# Patient Record
Sex: FEMALE | Race: WHITE | NOT HISPANIC OR LATINO | Employment: OTHER | URBAN - METROPOLITAN AREA
[De-identification: names, ages, dates, MRNs, and addresses within clinical notes are randomized per-mention and may not be internally consistent; named-entity substitution may affect disease eponyms.]

---

## 2023-10-17 ENCOUNTER — HOSPITAL ENCOUNTER (EMERGENCY)
Facility: HOSPITAL | Age: 68
Discharge: HOME | End: 2023-10-17
Attending: STUDENT IN AN ORGANIZED HEALTH CARE EDUCATION/TRAINING PROGRAM
Payer: OTHER GOVERNMENT

## 2023-10-17 ENCOUNTER — APPOINTMENT (OUTPATIENT)
Dept: RADIOLOGY | Facility: HOSPITAL | Age: 68
End: 2023-10-17
Payer: OTHER GOVERNMENT

## 2023-10-17 VITALS
SYSTOLIC BLOOD PRESSURE: 145 MMHG | RESPIRATION RATE: 18 BRPM | WEIGHT: 216.49 LBS | HEART RATE: 72 BPM | OXYGEN SATURATION: 98 % | DIASTOLIC BLOOD PRESSURE: 65 MMHG | BODY MASS INDEX: 33.98 KG/M2 | TEMPERATURE: 98.2 F | HEIGHT: 67 IN

## 2023-10-17 DIAGNOSIS — M25.551 PAIN OF RIGHT HIP: ICD-10-CM

## 2023-10-17 DIAGNOSIS — M54.31 SCIATICA OF RIGHT SIDE: Primary | ICD-10-CM

## 2023-10-17 PROCEDURE — 2500000001 HC RX 250 WO HCPCS SELF ADMINISTERED DRUGS (ALT 637 FOR MEDICARE OP): Performed by: PHYSICIAN ASSISTANT

## 2023-10-17 PROCEDURE — 96372 THER/PROPH/DIAG INJ SC/IM: CPT

## 2023-10-17 PROCEDURE — 73502 X-RAY EXAM HIP UNI 2-3 VIEWS: CPT | Mod: RT

## 2023-10-17 PROCEDURE — 2500000004 HC RX 250 GENERAL PHARMACY W/ HCPCS (ALT 636 FOR OP/ED): Performed by: PHYSICIAN ASSISTANT

## 2023-10-17 PROCEDURE — 99284 EMERGENCY DEPT VISIT MOD MDM: CPT | Mod: 25 | Performed by: STUDENT IN AN ORGANIZED HEALTH CARE EDUCATION/TRAINING PROGRAM

## 2023-10-17 PROCEDURE — 72100 X-RAY EXAM L-S SPINE 2/3 VWS: CPT | Mod: FY

## 2023-10-17 RX ORDER — ORPHENADRINE CITRATE 100 MG/1
100 TABLET, EXTENDED RELEASE ORAL 2 TIMES DAILY PRN
Status: DISCONTINUED | OUTPATIENT
Start: 2023-10-17 | End: 2023-10-17 | Stop reason: HOSPADM

## 2023-10-17 RX ORDER — HYDROCODONE BITARTRATE AND ACETAMINOPHEN 5; 325 MG/1; MG/1
1 TABLET ORAL ONCE
Status: COMPLETED | OUTPATIENT
Start: 2023-10-17 | End: 2023-10-17

## 2023-10-17 RX ORDER — HYDROCODONE BITARTRATE AND ACETAMINOPHEN 5; 325 MG/1; MG/1
1 TABLET ORAL EVERY 6 HOURS PRN
Qty: 10 TABLET | Refills: 0 | Status: SHIPPED | OUTPATIENT
Start: 2023-10-17

## 2023-10-17 RX ORDER — ACETAMINOPHEN 325 MG/1
650 TABLET ORAL ONCE
Status: COMPLETED | OUTPATIENT
Start: 2023-10-17 | End: 2023-10-17

## 2023-10-17 RX ORDER — KETOROLAC TROMETHAMINE 30 MG/ML
30 INJECTION, SOLUTION INTRAMUSCULAR; INTRAVENOUS ONCE
Status: COMPLETED | OUTPATIENT
Start: 2023-10-17 | End: 2023-10-17

## 2023-10-17 RX ORDER — HYDROCODONE BITARTRATE AND ACETAMINOPHEN 5; 325 MG/1; MG/1
1 TABLET ORAL EVERY 6 HOURS PRN
Qty: 10 TABLET | Refills: 0 | Status: SHIPPED | OUTPATIENT
Start: 2023-10-17 | End: 2023-10-17 | Stop reason: SDUPTHER

## 2023-10-17 RX ORDER — METHYLPREDNISOLONE 4 MG/1
TABLET ORAL
Qty: 21 TABLET | Refills: 0 | Status: SHIPPED | OUTPATIENT
Start: 2023-10-17 | End: 2023-10-27 | Stop reason: HOSPADM

## 2023-10-17 RX ORDER — ORPHENADRINE CITRATE 100 MG/1
100 TABLET, EXTENDED RELEASE ORAL 2 TIMES DAILY PRN
Qty: 14 TABLET | Refills: 0 | Status: SHIPPED | OUTPATIENT
Start: 2023-10-17

## 2023-10-17 RX ADMIN — HYDROCODONE BITARTRATE AND ACETAMINOPHEN 1 TABLET: 5; 325 TABLET ORAL at 15:57

## 2023-10-17 RX ADMIN — ACETAMINOPHEN 650 MG: 325 TABLET ORAL at 15:55

## 2023-10-17 RX ADMIN — ORPHENADRINE CITRATE 100 MG: 100 TABLET, EXTENDED RELEASE ORAL at 17:01

## 2023-10-17 RX ADMIN — KETOROLAC TROMETHAMINE 30 MG: 30 INJECTION, SOLUTION INTRAMUSCULAR at 15:56

## 2023-10-17 ASSESSMENT — PAIN DESCRIPTION - DESCRIPTORS: DESCRIPTORS: SHARP;STABBING;BURNING

## 2023-10-17 ASSESSMENT — PAIN SCALES - GENERAL
PAINLEVEL_OUTOF10: 10 - WORST POSSIBLE PAIN
PAINLEVEL_OUTOF10: 2
PAINLEVEL_OUTOF10: 10 - WORST POSSIBLE PAIN
PAINLEVEL_OUTOF10: 3
PAINLEVEL_OUTOF10: 9
PAINLEVEL_OUTOF10: 10 - WORST POSSIBLE PAIN

## 2023-10-17 ASSESSMENT — PAIN DESCRIPTION - ORIENTATION
ORIENTATION: RIGHT
ORIENTATION: RIGHT

## 2023-10-17 ASSESSMENT — PAIN - FUNCTIONAL ASSESSMENT: PAIN_FUNCTIONAL_ASSESSMENT: 0-10

## 2023-10-17 ASSESSMENT — PAIN DESCRIPTION - LOCATION
LOCATION: HIP
LOCATION: HIP

## 2023-10-17 ASSESSMENT — PAIN DESCRIPTION - FREQUENCY: FREQUENCY: CONSTANT/CONTINUOUS

## 2023-10-17 ASSESSMENT — PAIN DESCRIPTION - ONSET: ONSET: GRADUAL

## 2023-10-17 ASSESSMENT — PAIN DESCRIPTION - PROGRESSION: CLINICAL_PROGRESSION: NOT CHANGED

## 2023-10-17 ASSESSMENT — PAIN DESCRIPTION - PAIN TYPE: TYPE: ACUTE PAIN

## 2023-10-17 NOTE — ED PROVIDER NOTES
HPI   Chief Complaint   Patient presents with   • Hip Pain     Pt states that two weeks ago she had four or five falls in the span of a few days. She walks with a walker. Pt states that the past five days she's had increasing right hip pain. No BT.  Did not hit head/No LOC either time. No falls today.       68-year-old female presented emergency department with a chief complaint of right back and right hip pain that radiates down her right lateral leg.  She has been dealing with this for the last several weeks.  Has been using lidocaine patches.  She walks with a walker at her baseline.  She states that she had a fall about 3 weeks ago as well.  She states she had prior lumbar surgery.  She denies bowel bladder dysfunction, saddle anesthesia.                          [place] Coma Scale Score: 15                  Patient History   No past medical history on file.  Past Surgical History:   Procedure Laterality Date   • MR HEAD ANGIO WO IV CONTRAST  2/22/2022    MR HEAD ANGIO WO IV CONTRAST [facility]   • MR HEAD ANGIO WO IV CONTRAST  2/22/2022    MR HEAD ANGIO WO IV CONTRAST [facility]   • MR HEAD ANGIO WO IV CONTRAST  4/28/2023    MR HEAD ANGIO WO IV CONTRAST [facility]   • MR NECK ANGIO WO IV CONTRAST  2/22/2022    MR NECK ANGIO WO IV CONTRAST [facility]   • MR NECK ANGIO WO IV CONTRAST  4/28/2023    MR NECK ANGIO WO IV CONTRAST [facility]     No family history on file.  Social History     Tobacco Use   • Smoking status: Not on file   • Smokeless tobacco: Not on file   Substance Use Topics   • Alcohol use: Not on file   • Drug use: Not on file       Physical Exam   ED Triage Vitals [10/17/23 1505]   Temp Heart Rate Resp BP   36.8 °C (98.3 °F) 72 18 145/65      SpO2 Temp Source Heart Rate Source Patient Position   98 % Oral -- Lying      BP Location FiO2 (%)     Right arm --       Physical Exam  Vitals and nursing note reviewed.   Constitutional:       Appearance:  Normal appearance.   HENT:      Head: Normocephalic and atraumatic.   Cardiovascular:      Rate and Rhythm: Normal rate and regular rhythm.   Pulmonary:      Effort: Pulmonary effort is normal.      Breath sounds: Normal breath sounds.   Abdominal:      General: Abdomen is flat.      Palpations: Abdomen is soft.   Musculoskeletal:      Cervical back: Normal range of motion.      Comments: Tenderness to right sacroiliac joint, forage motion of lower extremities bilaterally   Skin:     General: Skin is warm and dry.   Neurological:      General: No focal deficit present.      Mental Status: She is alert and oriented to person, place, and time.      Comments: Strength and sensation intact in lower extremities bilaterally   Psychiatric:         Mood and Affect: Mood normal.         ED Course & MDM   Diagnoses as of 10/17/23 1729   Sciatica of right side   Pain of right hip       Medical Decision Making  XR hip right 2 or 3 views   Final Result    No acute findings.                MACRO:    None          Signed by: Jamil Escalera 10/17/2023 4:17 PM    Dictation workstation:   [id number]     XR lumbar spine 2-3 views   Final Result    1. No acute lumbar spine findings.    2. Interval mild progression of adjacent disc disease at L2-L3.                MACRO:    None          Signed by: Jamil Escalera 10/17/2023 4:15 PM    Dictation workstation:   [id number]     I have seen and evaluated this patient.  The attending physician has also seen and evaluated this patient.  Vital signs, laboratory testing and diagnostic images if applicable have been reviewed.  All laboratory and imaging is interpreted by myself unless otherwise stated.  Radiology studies are also formally interpreted by radiologist.    X-rays without acute finding.  Patient feels improved in emergency department.  She will be discharged with outpatient primary follow-up.    Prescriptions: Medrol Dosepak, 5 mg Norco, 100 mg orphenadrine         Procedure  Procedures      Chon Gutierres PA-C  10/17/23 1726       Chon Gutierres PA-C  10/17/23 172

## 2023-10-23 ENCOUNTER — HOSPITAL ENCOUNTER (INPATIENT)
Facility: HOSPITAL | Age: 68
LOS: 4 days | Discharge: NURSING, REHAB OR LONG-TERM CARE | DRG: 690 | End: 2023-10-27
Attending: EMERGENCY MEDICINE | Admitting: INTERNAL MEDICINE
Payer: MEDICARE

## 2023-10-23 ENCOUNTER — APPOINTMENT (OUTPATIENT)
Dept: RADIOLOGY | Facility: HOSPITAL | Age: 68
DRG: 690 | End: 2023-10-23
Payer: MEDICARE

## 2023-10-23 DIAGNOSIS — Z53.20 FUNCTIONAL ASSESSMENT DECLINED: ICD-10-CM

## 2023-10-23 DIAGNOSIS — N39.0 URINARY TRACT INFECTION WITHOUT HEMATURIA, SITE UNSPECIFIED: ICD-10-CM

## 2023-10-23 DIAGNOSIS — S09.90XA CLOSED HEAD INJURY, INITIAL ENCOUNTER: Primary | ICD-10-CM

## 2023-10-23 DIAGNOSIS — Z72.0 TOBACCO USE: ICD-10-CM

## 2023-10-23 DIAGNOSIS — R53.1 GENERALIZED WEAKNESS: ICD-10-CM

## 2023-10-23 DIAGNOSIS — M25.551 PAIN OF RIGHT HIP: ICD-10-CM

## 2023-10-23 DIAGNOSIS — N30.00 ACUTE CYSTITIS WITHOUT HEMATURIA: ICD-10-CM

## 2023-10-23 DIAGNOSIS — K59.00 CONSTIPATION, UNSPECIFIED CONSTIPATION TYPE: ICD-10-CM
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
## 2023-10-23 DIAGNOSIS — S01.01XA LACERATION OF SCALP, INITIAL ENCOUNTER: ICD-10-CM

## 2023-10-23 LAB
ALBUMIN SERPL-MCNC: 4.4 G/DL (ref 3.5–5)
ALP BLD-CCNC: 153 U/L (ref 35–125)
ALT SERPL-CCNC: 36 U/L (ref 5–40)
ANION GAP SERPL CALC-SCNC: 14 MMOL/L
APPEARANCE UR: ABNORMAL
AST SERPL-CCNC: 24 U/L (ref 5–40)
BACTERIA #/AREA URNS AUTO: ABNORMAL /HPF
BILIRUB SERPL-MCNC: 0.3 MG/DL (ref 0.1–1.2)
BILIRUB UR STRIP.AUTO-MCNC: NEGATIVE MG/DL
BUN SERPL-MCNC: 29 MG/DL (ref 8–25)
CALCIUM SERPL-MCNC: 9.5 MG/DL (ref 8.5–10.4)
CHLORIDE SERPL-SCNC: 95 MMOL/L (ref 97–107)
CK SERPL-CCNC: 42 U/L (ref 24–195)
CO2 SERPL-SCNC: 23 MMOL/L (ref 24–31)
COLOR UR: YELLOW
CREAT SERPL-MCNC: 0.9 MG/DL (ref 0.4–1.6)
ERYTHROCYTE [DISTWIDTH] IN BLOOD BY AUTOMATED COUNT: 14.2 % (ref 11.5–14.5)
GFR SERPL CREATININE-BSD FRML MDRD: 69 ML/MIN/1.73M*2
GLUCOSE SERPL-MCNC: 144 MG/DL (ref 65–99)
GLUCOSE UR STRIP.AUTO-MCNC: NORMAL MG/DL
HCT VFR BLD AUTO: 44.6 % (ref 36–46)
HGB BLD-MCNC: 15.2 G/DL (ref 12–16)
KETONES UR STRIP.AUTO-MCNC: NEGATIVE MG/DL
LEUKOCYTE ESTERASE UR QL STRIP.AUTO: ABNORMAL
MAGNESIUM SERPL-MCNC: 2.2 MG/DL (ref 1.6–3.1)
MCH RBC QN AUTO: 31.1 PG (ref 26–34)
MCHC RBC AUTO-ENTMCNC: 34.1 G/DL (ref 32–36)
MCV RBC AUTO: 91 FL (ref 80–100)
NITRITE UR QL STRIP.AUTO: ABNORMAL
NRBC BLD-RTO: 0 /100 WBCS (ref 0–0)
PH UR STRIP.AUTO: 5.5 [PH]
PLATELET # BLD AUTO: 321 X10*3/UL (ref 150–450)
PMV BLD AUTO: 10.7 FL (ref 7.5–11.5)
POTASSIUM SERPL-SCNC: 4.1 MMOL/L (ref 3.4–5.1)
PROT SERPL-MCNC: 7.2 G/DL (ref 5.9–7.9)
PROT UR STRIP.AUTO-MCNC: ABNORMAL MG/DL
RBC # BLD AUTO: 4.89 X10*6/UL (ref 4–5.2)
RBC # UR STRIP.AUTO: NEGATIVE /UL
RBC #/AREA URNS AUTO: ABNORMAL /HPF
SODIUM SERPL-SCNC: 132 MMOL/L (ref 133–145)
SP GR UR STRIP.AUTO: 1.02
SQUAMOUS #/AREA URNS AUTO: ABNORMAL /HPF
TROPONIN T SERPL-MCNC: 10 NG/L
TROPONIN T SERPL-MCNC: 10 NG/L
TROPONIN T SERPL-MCNC: 9 NG/L
UROBILINOGEN UR STRIP.AUTO-MCNC: NORMAL MG/DL
WBC # BLD AUTO: 12.4 X10*3/UL (ref 4.4–11.3)
WBC #/AREA URNS AUTO: ABNORMAL /HPF

## 2023-10-23 PROCEDURE — 70450 CT HEAD/BRAIN W/O DYE: CPT | Mod: MG

## 2023-10-23 PROCEDURE — 99285 EMERGENCY DEPT VISIT HI MDM: CPT | Mod: 25 | Performed by: EMERGENCY MEDICINE

## 2023-10-23 PROCEDURE — 71046 X-RAY EXAM CHEST 2 VIEWS: CPT

## 2023-10-23 PROCEDURE — 85027 COMPLETE CBC AUTOMATED: CPT | Performed by: CLINICAL NURSE SPECIALIST

## 2023-10-23 PROCEDURE — 2500000004 HC RX 250 GENERAL PHARMACY W/ HCPCS (ALT 636 FOR OP/ED): Performed by: CLINICAL NURSE SPECIALIST

## 2023-10-23 PROCEDURE — 84484 ASSAY OF TROPONIN QUANT: CPT | Performed by: CLINICAL NURSE SPECIALIST

## 2023-10-23 PROCEDURE — 36415 COLL VENOUS BLD VENIPUNCTURE: CPT | Performed by: CLINICAL NURSE SPECIALIST

## 2023-10-23 PROCEDURE — 12032 INTMD RPR S/A/T/EXT 2.6-7.5: CPT | Performed by: CLINICAL NURSE SPECIALIST

## 2023-10-23 PROCEDURE — 93010 ELECTROCARDIOGRAM REPORT: CPT | Performed by: INTERNAL MEDICINE

## 2023-10-23 PROCEDURE — 83735 ASSAY OF MAGNESIUM: CPT | Performed by: CLINICAL NURSE SPECIALIST

## 2023-10-23 PROCEDURE — 81001 URINALYSIS AUTO W/SCOPE: CPT | Performed by: CLINICAL NURSE SPECIALIST

## 2023-10-23 PROCEDURE — 0HQ1XZZ REPAIR FACE SKIN, EXTERNAL APPROACH: ICD-10-PCS | Performed by: CLINICAL NURSE SPECIALIST

## 2023-10-23 PROCEDURE — 82550 ASSAY OF CK (CPK): CPT | Performed by: CLINICAL NURSE SPECIALIST

## 2023-10-23 PROCEDURE — 9420000001 HC RT PATIENT EDUCATION 5 MIN

## 2023-10-23 PROCEDURE — 90471 IMMUNIZATION ADMIN: CPT | Performed by: CLINICAL NURSE SPECIALIST

## 2023-10-23 PROCEDURE — 80053 COMPREHEN METABOLIC PANEL: CPT | Performed by: CLINICAL NURSE SPECIALIST

## 2023-10-23 PROCEDURE — 2500000005 HC RX 250 GENERAL PHARMACY W/O HCPCS: Performed by: CLINICAL NURSE SPECIALIST

## 2023-10-23 PROCEDURE — 96365 THER/PROPH/DIAG IV INF INIT: CPT | Mod: 59

## 2023-10-23 PROCEDURE — 90715 TDAP VACCINE 7 YRS/> IM: CPT | Performed by: CLINICAL NURSE SPECIALIST

## 2023-10-23 PROCEDURE — 72125 CT NECK SPINE W/O DYE: CPT | Mod: ME

## 2023-10-23 PROCEDURE — 1200000002 HC GENERAL ROOM WITH TELEMETRY DAILY

## 2023-10-23 RX ORDER — AMLODIPINE BESYLATE 10 MG/1
10 TABLET ORAL DAILY
Status: DISCONTINUED | OUTPATIENT
Start: 2023-10-24 | End: 2023-10-27 | Stop reason: HOSPADM

## 2023-10-23 RX ORDER — BISOPROLOL FUMARATE 5 MG/1
5 TABLET, FILM COATED ORAL DAILY
Status: DISCONTINUED | OUTPATIENT
Start: 2023-10-24 | End: 2023-10-27 | Stop reason: HOSPADM

## 2023-10-23 RX ORDER — CEFTRIAXONE 1 G/50ML
1 INJECTION, SOLUTION INTRAVENOUS EVERY 24 HOURS
Status: DISCONTINUED | OUTPATIENT
Start: 2023-10-24 | End: 2023-10-27 | Stop reason: HOSPADM

## 2023-10-23 RX ORDER — HYDRALAZINE HYDROCHLORIDE 10 MG/1
10 TABLET, FILM COATED ORAL 3 TIMES DAILY
COMMUNITY
Start: 2023-06-01

## 2023-10-23 RX ORDER — VENLAFAXINE HYDROCHLORIDE 150 MG/1
2 CAPSULE, EXTENDED RELEASE ORAL DAILY
COMMUNITY
Start: 2023-10-06

## 2023-10-23 RX ORDER — TRAMADOL HYDROCHLORIDE 50 MG/1
50 TABLET ORAL EVERY 6 HOURS PRN
Status: DISCONTINUED | OUTPATIENT
Start: 2023-10-23 | End: 2023-10-27 | Stop reason: HOSPADM

## 2023-10-23 RX ORDER — TRAZODONE HYDROCHLORIDE 100 MG/1
100 TABLET ORAL NIGHTLY
Status: DISCONTINUED | OUTPATIENT
Start: 2023-10-23 | End: 2023-10-24

## 2023-10-23 RX ORDER — HYDROXYZINE PAMOATE 25 MG/1
25 CAPSULE ORAL 3 TIMES DAILY PRN
COMMUNITY
Start: 2023-10-06

## 2023-10-23 RX ORDER — METHENAMINE HIPPURATE 1000 MG/1
1 TABLET ORAL 2 TIMES DAILY
COMMUNITY
Start: 2023-06-22

## 2023-10-23 RX ORDER — ACETAMINOPHEN 325 MG/1
650 TABLET ORAL EVERY 4 HOURS PRN
Status: DISCONTINUED | OUTPATIENT
Start: 2023-10-23 | End: 2023-10-27 | Stop reason: HOSPADM

## 2023-10-23 RX ORDER — HYDROCODONE BITARTRATE AND ACETAMINOPHEN 5; 325 MG/1; MG/1
1 TABLET ORAL EVERY 6 HOURS PRN
Status: DISCONTINUED | OUTPATIENT
Start: 2023-10-23 | End: 2023-10-27 | Stop reason: HOSPADM

## 2023-10-23 RX ORDER — TRAZODONE HYDROCHLORIDE 100 MG/1
100 TABLET ORAL NIGHTLY
Status: ON HOLD | COMMUNITY
Start: 2011-07-25 | End: 2023-10-27

## 2023-10-23 RX ORDER — CHOLECALCIFEROL (VITAMIN D3) 50 MCG
2000 TABLET ORAL DAILY
Status: DISCONTINUED | OUTPATIENT
Start: 2023-10-24 | End: 2023-10-27 | Stop reason: HOSPADM

## 2023-10-23 RX ORDER — HYDRALAZINE HYDROCHLORIDE 10 MG/1
10 TABLET, FILM COATED ORAL 3 TIMES DAILY
Status: DISCONTINUED | OUTPATIENT
Start: 2023-10-23 | End: 2023-10-27 | Stop reason: HOSPADM

## 2023-10-23 RX ORDER — ALBUTEROL SULFATE 90 UG/1
2 AEROSOL, METERED RESPIRATORY (INHALATION) EVERY 6 HOURS PRN
COMMUNITY
Start: 2023-06-01

## 2023-10-23 RX ORDER — METHENAMINE HIPPURATE 1000 MG/1
1 TABLET ORAL 2 TIMES DAILY
Status: DISCONTINUED | OUTPATIENT
Start: 2023-10-23 | End: 2023-10-27 | Stop reason: HOSPADM

## 2023-10-23 RX ORDER — TALC
3 POWDER (GRAM) TOPICAL NIGHTLY
Status: DISCONTINUED | OUTPATIENT
Start: 2023-10-23 | End: 2023-10-27 | Stop reason: HOSPADM

## 2023-10-23 RX ORDER — NYSTATIN 100000 U/G
CREAM TOPICAL AS NEEDED
Status: DISCONTINUED | OUTPATIENT
Start: 2023-10-23 | End: 2023-10-27 | Stop reason: HOSPADM

## 2023-10-23 RX ORDER — DOCUSATE SODIUM 100 MG/1
100 CAPSULE, LIQUID FILLED ORAL 2 TIMES DAILY
Status: DISCONTINUED | OUTPATIENT
Start: 2023-10-23 | End: 2023-10-27 | Stop reason: HOSPADM

## 2023-10-23 RX ORDER — BUSPIRONE HYDROCHLORIDE 15 MG/1
15 TABLET ORAL 2 TIMES DAILY
COMMUNITY
Start: 2011-07-25

## 2023-10-23 RX ORDER — DULOXETIN HYDROCHLORIDE 60 MG/1
60 CAPSULE, DELAYED RELEASE ORAL DAILY
Status: DISCONTINUED | OUTPATIENT
Start: 2023-10-24 | End: 2023-10-24

## 2023-10-23 RX ORDER — HYDROXYZINE PAMOATE 25 MG/1
25 CAPSULE ORAL 3 TIMES DAILY PRN
Status: DISCONTINUED | OUTPATIENT
Start: 2023-10-23 | End: 2023-10-24

## 2023-10-23 RX ORDER — ORPHENADRINE CITRATE 100 MG/1
100 TABLET, EXTENDED RELEASE ORAL 2 TIMES DAILY PRN
Status: DISCONTINUED | OUTPATIENT
Start: 2023-10-23 | End: 2023-10-27 | Stop reason: HOSPADM

## 2023-10-23 RX ORDER — ALBUTEROL SULFATE 0.83 MG/ML
2.5 SOLUTION RESPIRATORY (INHALATION) 4 TIMES DAILY
COMMUNITY
Start: 2023-09-28

## 2023-10-23 RX ORDER — ALBUTEROL SULFATE 0.83 MG/ML
2.5 SOLUTION RESPIRATORY (INHALATION) 4 TIMES DAILY
Status: DISCONTINUED | OUTPATIENT
Start: 2023-10-23 | End: 2023-10-24

## 2023-10-23 RX ORDER — GUAIFENESIN 600 MG/1
600 TABLET, EXTENDED RELEASE ORAL EVERY 12 HOURS PRN
Status: DISCONTINUED | OUTPATIENT
Start: 2023-10-23 | End: 2023-10-27 | Stop reason: HOSPADM

## 2023-10-23 RX ORDER — HEPARIN SODIUM 5000 [USP'U]/ML
5000 INJECTION, SOLUTION INTRAVENOUS; SUBCUTANEOUS EVERY 8 HOURS
Status: DISCONTINUED | OUTPATIENT
Start: 2023-10-23 | End: 2023-10-27 | Stop reason: HOSPADM

## 2023-10-23 RX ORDER — ASPIRIN 81 MG/1
81 TABLET ORAL DAILY
COMMUNITY
Start: 2023-06-01

## 2023-10-23 RX ORDER — ESTRADIOL 0.1 MG/G
0.5 CREAM VAGINAL 2 TIMES WEEKLY
Status: DISCONTINUED | OUTPATIENT
Start: 2023-10-27 | End: 2023-10-24

## 2023-10-23 RX ORDER — TALC
3 POWDER (GRAM) TOPICAL NIGHTLY
COMMUNITY

## 2023-10-23 RX ORDER — FLUTICASONE FUROATE AND VILANTEROL 200; 25 UG/1; UG/1
1 POWDER RESPIRATORY (INHALATION)
Status: DISCONTINUED | OUTPATIENT
Start: 2023-10-24 | End: 2023-10-24 | Stop reason: SDUPTHER

## 2023-10-23 RX ORDER — FLUTICASONE PROPIONATE AND SALMETEROL 250; 50 UG/1; UG/1
1 POWDER RESPIRATORY (INHALATION)
COMMUNITY
Start: 2023-06-28 | End: 2023-10-27 | Stop reason: HOSPADM

## 2023-10-23 RX ORDER — FORMOTEROL FUMARATE DIHYDRATE 20 UG/2ML
20 SOLUTION RESPIRATORY (INHALATION)
Status: DISCONTINUED | OUTPATIENT
Start: 2023-10-23 | End: 2023-10-27 | Stop reason: HOSPADM

## 2023-10-23 RX ORDER — DULOXETIN HYDROCHLORIDE 60 MG/1
60 CAPSULE, DELAYED RELEASE ORAL DAILY
COMMUNITY
Start: 2011-07-25

## 2023-10-23 RX ORDER — CARBAMAZEPINE 200 MG/1
200 TABLET ORAL 2 TIMES DAILY
COMMUNITY
Start: 2023-09-20

## 2023-10-23 RX ORDER — ACETAMINOPHEN 650 MG/1
650 SUPPOSITORY RECTAL EVERY 4 HOURS PRN
Status: DISCONTINUED | OUTPATIENT
Start: 2023-10-23 | End: 2023-10-27 | Stop reason: HOSPADM

## 2023-10-23 RX ORDER — VENLAFAXINE HYDROCHLORIDE 150 MG/1
150 TABLET, EXTENDED RELEASE ORAL
COMMUNITY
Start: 2022-09-01 | End: 2023-10-27 | Stop reason: HOSPADM

## 2023-10-23 RX ORDER — LOVASTATIN 20 MG/1
1 TABLET ORAL NIGHTLY
COMMUNITY
Start: 2011-07-25 | End: 2024-04-16 | Stop reason: ENTERED-IN-ERROR

## 2023-10-23 RX ORDER — CARBAMAZEPINE 200 MG/1
200 TABLET ORAL 2 TIMES DAILY
Status: DISCONTINUED | OUTPATIENT
Start: 2023-10-23 | End: 2023-10-24 | Stop reason: DRUGHIGH

## 2023-10-23 RX ORDER — ATORVASTATIN CALCIUM 40 MG/1
40 TABLET, FILM COATED ORAL NIGHTLY
Status: DISCONTINUED | OUTPATIENT
Start: 2023-10-23 | End: 2023-10-27 | Stop reason: HOSPADM

## 2023-10-23 RX ORDER — ASPIRIN 81 MG/1
81 TABLET ORAL DAILY
Status: DISCONTINUED | OUTPATIENT
Start: 2023-10-24 | End: 2023-10-27 | Stop reason: HOSPADM

## 2023-10-23 RX ORDER — ATORVASTATIN CALCIUM 40 MG/1
40 TABLET, FILM COATED ORAL NIGHTLY
COMMUNITY
Start: 2023-06-01

## 2023-10-23 RX ORDER — CHOLECALCIFEROL (VITAMIN D3) 50 MCG
2000 TABLET ORAL DAILY
COMMUNITY

## 2023-10-23 RX ORDER — GABAPENTIN 300 MG/1
900 CAPSULE ORAL 3 TIMES DAILY
COMMUNITY
Start: 2023-03-14

## 2023-10-23 RX ORDER — HYDROXYZINE PAMOATE 50 MG/1
50 CAPSULE ORAL 3 TIMES DAILY PRN
COMMUNITY
Start: 2011-07-25 | End: 2023-10-27 | Stop reason: HOSPADM

## 2023-10-23 RX ORDER — NYSTATIN 100000 U/G
100000 CREAM TOPICAL AS NEEDED
COMMUNITY
Start: 2022-12-21

## 2023-10-23 RX ORDER — GABAPENTIN 300 MG/1
900 CAPSULE ORAL 3 TIMES DAILY
Status: DISCONTINUED | OUTPATIENT
Start: 2023-10-23 | End: 2023-10-27 | Stop reason: HOSPADM

## 2023-10-23 RX ORDER — ALBUTEROL SULFATE 90 UG/1
2 AEROSOL, METERED RESPIRATORY (INHALATION) EVERY 6 HOURS PRN
Status: DISCONTINUED | OUTPATIENT
Start: 2023-10-23 | End: 2023-10-27 | Stop reason: HOSPADM

## 2023-10-23 RX ORDER — VENLAFAXINE HYDROCHLORIDE 150 MG/1
300 CAPSULE, EXTENDED RELEASE ORAL DAILY
Status: DISCONTINUED | OUTPATIENT
Start: 2023-10-24 | End: 2023-10-27 | Stop reason: HOSPADM

## 2023-10-23 RX ORDER — LIDOCAINE HYDROCHLORIDE 10 MG/ML
10 INJECTION INFILTRATION; PERINEURAL ONCE
Status: COMPLETED | OUTPATIENT
Start: 2023-10-23 | End: 2023-10-23

## 2023-10-23 RX ORDER — TRAMADOL HYDROCHLORIDE 50 MG/1
50 TABLET ORAL EVERY 6 HOURS PRN
Status: ON HOLD | COMMUNITY
Start: 2016-03-22 | End: 2023-10-27

## 2023-10-23 RX ORDER — DOCUSATE SODIUM 100 MG/1
100 CAPSULE, LIQUID FILLED ORAL 2 TIMES DAILY
COMMUNITY

## 2023-10-23 RX ORDER — BUSPIRONE HYDROCHLORIDE 5 MG/1
15 TABLET ORAL 2 TIMES DAILY
Status: DISCONTINUED | OUTPATIENT
Start: 2023-10-23 | End: 2023-10-27 | Stop reason: HOSPADM

## 2023-10-23 RX ORDER — ACETAMINOPHEN 325 MG/1
650 TABLET ORAL ONCE
Status: DISCONTINUED | OUTPATIENT
Start: 2023-10-23 | End: 2023-10-27 | Stop reason: HOSPADM

## 2023-10-23 RX ORDER — OMEPRAZOLE 20 MG/1
20 TABLET, DELAYED RELEASE ORAL
COMMUNITY

## 2023-10-23 RX ORDER — AMLODIPINE BESYLATE 10 MG/1
1 TABLET ORAL DAILY
COMMUNITY
Start: 2023-06-01

## 2023-10-23 RX ORDER — ACETAMINOPHEN 160 MG/5ML
650 SOLUTION ORAL EVERY 4 HOURS PRN
Status: DISCONTINUED | OUTPATIENT
Start: 2023-10-23 | End: 2023-10-27 | Stop reason: HOSPADM

## 2023-10-23 RX ORDER — CEFTRIAXONE 1 G/50ML
1 INJECTION, SOLUTION INTRAVENOUS ONCE
Status: COMPLETED | OUTPATIENT
Start: 2023-10-23 | End: 2023-10-23

## 2023-10-23 RX ORDER — FLUTICASONE PROPIONATE AND SALMETEROL 250; 50 UG/1; UG/1
1 POWDER RESPIRATORY (INHALATION)
COMMUNITY
Start: 2022-09-01

## 2023-10-23 RX ORDER — LOVASTATIN 20 MG/1
20 TABLET ORAL NIGHTLY
Status: DISCONTINUED | OUTPATIENT
Start: 2023-10-23 | End: 2023-10-24

## 2023-10-23 RX ORDER — FLUTICASONE FUROATE AND VILANTEROL 200; 25 UG/1; UG/1
1 POWDER RESPIRATORY (INHALATION)
Status: DISCONTINUED | OUTPATIENT
Start: 2023-10-24 | End: 2023-10-27 | Stop reason: HOSPADM

## 2023-10-23 RX ORDER — POLYETHYLENE GLYCOL 3350 17 G/17G
17 POWDER, FOR SOLUTION ORAL DAILY PRN
Status: DISCONTINUED | OUTPATIENT
Start: 2023-10-23 | End: 2023-10-27 | Stop reason: HOSPADM

## 2023-10-23 RX ORDER — GUAIFENESIN/DEXTROMETHORPHAN 100-10MG/5
5 SYRUP ORAL EVERY 4 HOURS PRN
Status: DISCONTINUED | OUTPATIENT
Start: 2023-10-23 | End: 2023-10-27 | Stop reason: HOSPADM

## 2023-10-23 RX ORDER — LISINOPRIL 20 MG/1
20 TABLET ORAL
Status: DISCONTINUED | OUTPATIENT
Start: 2023-10-24 | End: 2023-10-24

## 2023-10-23 RX ORDER — PANTOPRAZOLE SODIUM 40 MG/1
40 TABLET, DELAYED RELEASE ORAL
Status: DISCONTINUED | OUTPATIENT
Start: 2023-10-24 | End: 2023-10-27 | Stop reason: HOSPADM

## 2023-10-23 RX ORDER — ESTRADIOL 0.1 MG/G
0.5 CREAM VAGINAL 2 TIMES WEEKLY
COMMUNITY
Start: 2023-08-08

## 2023-10-23 RX ORDER — ACETAMINOPHEN 325 MG/1
325 TABLET ORAL EVERY 6 HOURS PRN
COMMUNITY
Start: 2022-09-01

## 2023-10-23 RX ORDER — ACETAMINOPHEN 325 MG/1
325 TABLET ORAL EVERY 6 HOURS PRN
Status: DISCONTINUED | OUTPATIENT
Start: 2023-10-23 | End: 2023-10-27 | Stop reason: HOSPADM

## 2023-10-23 RX ORDER — LISINOPRIL 20 MG/1
20 TABLET ORAL
Status: ON HOLD | COMMUNITY
Start: 2011-07-25 | End: 2023-10-27

## 2023-10-23 RX ORDER — HYDROXYZINE PAMOATE 50 MG/1
50 CAPSULE ORAL 3 TIMES DAILY PRN
Status: DISCONTINUED | OUTPATIENT
Start: 2023-10-23 | End: 2023-10-24

## 2023-10-23 RX ORDER — BISOPROLOL FUMARATE 5 MG/1
1 TABLET, FILM COATED ORAL DAILY
COMMUNITY
Start: 2023-09-20

## 2023-10-23 RX ADMIN — CEFTRIAXONE SODIUM 1 G: 1 INJECTION, SOLUTION INTRAVENOUS at 18:22

## 2023-10-23 RX ADMIN — LIDOCAINE HYDROCHLORIDE 10 ML: 10 INJECTION, SOLUTION INFILTRATION; PERINEURAL at 12:24

## 2023-10-23 RX ADMIN — TETANUS TOXOID, REDUCED DIPHTHERIA TOXOID AND ACELLULAR PERTUSSIS VACCINE, ADSORBED 0.5 ML: 5; 2.5; 8; 8; 2.5 SUSPENSION INTRAMUSCULAR at 12:23

## 2023-10-23 SDOH — SOCIAL STABILITY: SOCIAL INSECURITY: ARE THERE ANY APPARENT SIGNS OF INJURIES/BEHAVIORS THAT COULD BE RELATED TO ABUSE/NEGLECT?: NO

## 2023-10-23 SDOH — SOCIAL STABILITY: SOCIAL INSECURITY: ARE YOU OR HAVE YOU BEEN THREATENED OR ABUSED PHYSICALLY, EMOTIONALLY, OR SEXUALLY BY ANYONE?: NO

## 2023-10-23 SDOH — SOCIAL STABILITY: SOCIAL INSECURITY: WERE YOU ABLE TO COMPLETE ALL THE BEHAVIORAL HEALTH SCREENINGS?: YES

## 2023-10-23 SDOH — SOCIAL STABILITY: SOCIAL INSECURITY: DOES ANYONE TRY TO KEEP YOU FROM HAVING/CONTACTING OTHER FRIENDS OR DOING THINGS OUTSIDE YOUR HOME?: NO

## 2023-10-23 SDOH — SOCIAL STABILITY: SOCIAL INSECURITY: ABUSE: ADULT

## 2023-10-23 SDOH — SOCIAL STABILITY: SOCIAL INSECURITY: HAS ANYONE EVER THREATENED TO HURT YOUR FAMILY OR YOUR PETS?: NO

## 2023-10-23 SDOH — SOCIAL STABILITY: SOCIAL INSECURITY: DO YOU FEEL UNSAFE GOING BACK TO THE PLACE WHERE YOU ARE LIVING?: NO

## 2023-10-23 SDOH — SOCIAL STABILITY: SOCIAL INSECURITY: HAVE YOU HAD THOUGHTS OF HARMING ANYONE ELSE?: NO

## 2023-10-23 SDOH — SOCIAL STABILITY: SOCIAL INSECURITY: DO YOU FEEL ANYONE HAS EXPLOITED OR TAKEN ADVANTAGE OF YOU FINANCIALLY OR OF YOUR PERSONAL PROPERTY?: NO

## 2023-10-23 ASSESSMENT — PAIN SCALES - GENERAL
PAINLEVEL_OUTOF10: 0 - NO PAIN
PAINLEVEL_OUTOF10: 9

## 2023-10-23 ASSESSMENT — LIFESTYLE VARIABLES
EVER FELT BAD OR GUILTY ABOUT YOUR DRINKING: NO
SKIP TO QUESTIONS 9-10: 1
EVER HAD A DRINK FIRST THING IN THE MORNING TO STEADY YOUR NERVES TO GET RID OF A HANGOVER: NO
HOW MANY STANDARD DRINKS CONTAINING ALCOHOL DO YOU HAVE ON A TYPICAL DAY: PATIENT DOES NOT DRINK
HOW OFTEN DO YOU HAVE A DRINK CONTAINING ALCOHOL: NEVER
AUDIT-C TOTAL SCORE: 0
AUDIT-C TOTAL SCORE: 0
HOW OFTEN DO YOU HAVE 6 OR MORE DRINKS ON ONE OCCASION: NEVER
REASON UNABLE TO ASSESS: NO
HAVE PEOPLE ANNOYED YOU BY CRITICIZING YOUR DRINKING: NO
HAVE YOU EVER FELT YOU SHOULD CUT DOWN ON YOUR DRINKING: NO

## 2023-10-23 ASSESSMENT — ACTIVITIES OF DAILY LIVING (ADL)
TOILETING: NEEDS ASSISTANCE
GROOMING: NEEDS ASSISTANCE
ADEQUATE_TO_COMPLETE_ADL: YES
WALKS IN HOME: NEEDS ASSISTANCE
HEARING - LEFT EAR: FUNCTIONAL
JUDGMENT_ADEQUATE_SAFELY_COMPLETE_DAILY_ACTIVITIES: YES
HEARING - RIGHT EAR: FUNCTIONAL
FEEDING YOURSELF: INDEPENDENT
BATHING: NEEDS ASSISTANCE
LACK_OF_TRANSPORTATION: NO
PATIENT'S MEMORY ADEQUATE TO SAFELY COMPLETE DAILY ACTIVITIES?: YES
DRESSING YOURSELF: NEEDS ASSISTANCE

## 2023-10-23 ASSESSMENT — COGNITIVE AND FUNCTIONAL STATUS - GENERAL
PATIENT BASELINE BEDBOUND: NO
DRESSING REGULAR LOWER BODY CLOTHING: A LITTLE
TOILETING: A LITTLE
MOBILITY SCORE: 17
HELP NEEDED FOR BATHING: A LITTLE
DRESSING REGULAR UPPER BODY CLOTHING: A LITTLE
TURNING FROM BACK TO SIDE WHILE IN FLAT BAD: A LITTLE
MOVING TO AND FROM BED TO CHAIR: A LITTLE
PERSONAL GROOMING: A LITTLE
WALKING IN HOSPITAL ROOM: A LOT
CLIMB 3 TO 5 STEPS WITH RAILING: A LOT
STANDING UP FROM CHAIR USING ARMS: A LITTLE
DAILY ACTIVITIY SCORE: 19

## 2023-10-23 ASSESSMENT — COLUMBIA-SUICIDE SEVERITY RATING SCALE - C-SSRS
1. IN THE PAST MONTH, HAVE YOU WISHED YOU WERE DEAD OR WISHED YOU COULD GO TO SLEEP AND NOT WAKE UP?: NO
6. HAVE YOU EVER DONE ANYTHING, STARTED TO DO ANYTHING, OR PREPARED TO DO ANYTHING TO END YOUR LIFE?: NO
2. HAVE YOU ACTUALLY HAD ANY THOUGHTS OF KILLING YOURSELF?: NO

## 2023-10-23 ASSESSMENT — PAIN - FUNCTIONAL ASSESSMENT
PAIN_FUNCTIONAL_ASSESSMENT: 0-10
PAIN_FUNCTIONAL_ASSESSMENT: 0-10

## 2023-10-23 ASSESSMENT — PATIENT HEALTH QUESTIONNAIRE - PHQ9
SUM OF ALL RESPONSES TO PHQ9 QUESTIONS 1 & 2: 0
1. LITTLE INTEREST OR PLEASURE IN DOING THINGS: NOT AT ALL
2. FEELING DOWN, DEPRESSED OR HOPELESS: NOT AT ALL

## 2023-10-23 NOTE — ED PROVIDER NOTES
"Department of Emergency Medicine   ED  Provider Note  Admit Date/RoomTime: 10/23/2023 11:33 AM  ED Room: ST23/ST23        History of Present Illness:  Chief Complaint   Patient presents with   • Fall     Patient states she has been weak and shaky lately and this morning she was trying to stand out of her wheelchair to get into her bed and fell, hitting her head on the jacqueline stand.  Patient has small laceration to left forehead.         Babar Quigley is a 68 y.o. female presenting to the ED for fall, beginning 830 this morning.  Patient states she has been feeling weak for the past 5 days.  Was trying to get out of her wheelchair by standing and her legs were weak and she was shaking causing her to fall hitting her head on a jacqueline  cabinet.  Denies loss of consciousness no nausea or vomiting.  Reports she was no able to get herself up and was on the floor till 1030 this morning until her a doctor.  She has had bleeding noted from the left side of her head.  She denies chest pain or shortness of breath no dizziness.  No abdominal pain.  Reports she just overall feels weak and shaky.  Patient reports he was in the hospital \"for hip pain.  She is unsure when her last tetanus shot was given.      Review of Systems:   Pertinent positives and negatives are stated within HPI, all other systems reviewed and are negative.        --------------------------------------------- PAST HISTORY ---------------------------------------------  Past Medical History:  has no past medical history on file.  Past Surgical History:  has a past surgical history that includes MR angio head wo IV contrast (2/22/2022); MR angio head wo IV contrast (2/22/2022); MR angio neck wo IV contrast (2/22/2022); MR angio neck wo IV contrast (4/28/2023); and MR angio head wo IV contrast (4/28/2023).  Social History:    Family History: family history is not on file.. Unless otherwise noted, family history is non contributory  The patient’s home medications " have been reviewed.  Allergies: Pregabalin and Sulfamethoxazole-trimethoprim        ---------------------------------------------------PHYSICAL EXAM--------------------------------------  Vitals:    10/23/23 1136   BP: (!) 158/99   Pulse: 89   Resp: 16   Temp: 36.7 °C (98.1 °F)   SpO2: 96%       GENERAL APPEARANCE: Awake and alert.   VITAL SIGNS: As per the nurses' triage record.   HEENT: Normocephalic, laceration to the left occipital region bleeding is controlled.  No raccoon eyes or armenta signs noted.  No hemotympanum noted.  No epistaxis noted.  No bite to the tongue or lip.  No pain with palpation over the orbital rings.  No septal hematoma noted.  Extraocular muscles are intact. Pupils equal round and reactive to light. Conjunctiva are pink. Negative scleral icterus. Mucous membranes are moist. Tongue in the midline. Pharynx was without erythema or exudates, uvula midline  NECK: Soft Nontender and supple, full gross ROM, no meningeal signs.  No pain palpation of the cervical spine no step-offs crepitus or bruising  CHEST: Nontender to palpation. Clear to auscultation bilaterally. No rales, rhonchi, or wheezing.   HEART: S1, S2. Regular rate and rhythm. No murmurs, gallops or rubs.  Strong and equal pulses in the extremities.   ABDOMEN: Soft, nontender, nondistended, positive bowel sounds, no palpable masses.  MUSCULCSKELETAL: The calves are nontender to palpation. Full gross active range of motion. Ambulating on own with no acute difficulties.  Peripheral pulses intact no edema noted.  No tremors noted  NEUROLOGICAL: Awake, alert and oriented x 3. Power intact in the upper and lower extremities. Sensation is intact to light touch in the upper and lower extremities.   IMMUNOLOGICAL: No lymphatic streaking noted   DERM: No petechiae, rashes, or ecchymoses.          ------------------------- NURSING NOTES AND VITALS REVIEWED ---------------------------  The nursing notes within the ED encounter and vital signs  "as below have been reviewed by myself  BP (!) 158/99 (BP Location: Left arm, Patient Position: Sitting)   Pulse 89   Temp 36.7 °C (98.1 °F) (Oral)   Resp 16   Ht 1.676 m (5' 6\")   Wt 98 kg (216 lb)   SpO2 96%   BMI 34.86 kg/m²     Oxygen Saturation Interpretation: Normal      The patient’s available past medical records and past encounters were reviewed.          -----------------------DIAGNOSTIC RESULTS------------------------  LABS:    Labs Reviewed   CBC - Abnormal       Result Value    WBC 12.4 (*)     nRBC 0.0      RBC 4.89      Hemoglobin 15.2      Hematocrit 44.6      MCV 91      MCH 31.1      MCHC 34.1      RDW 14.2      Platelets 321      MPV 10.7     COMPREHENSIVE METABOLIC PANEL - Abnormal    Glucose 144 (*)     Sodium 132 (*)     Potassium 4.1      Chloride 95 (*)     Bicarbonate 23 (*)     Urea Nitrogen 29 (*)     Creatinine 0.90      eGFR 69      Calcium 9.5      Albumin 4.4      Alkaline Phosphatase 153 (*)     Total Protein 7.2      AST 24      Bilirubin, Total 0.3      ALT 36      Anion Gap 14     URINALYSIS WITH REFLEX MICROSCOPIC - Abnormal    Color, Urine Yellow      Appearance, Urine Turbid (*)     Specific Gravity, Urine 1.025      pH, Urine 5.5      Protein, Urine 50 (1+) (*)     Glucose, Urine Normal      Blood, Urine NEGATIVE      Ketones, Urine NEGATIVE      Bilirubin, Urine NEGATIVE      Urobilinogen, Urine Normal      Nitrite, Urine 2+ (*)     Leukocyte Esterase, Urine 75 Bernice/µL (*)    MICROSCOPIC ONLY, URINE - Abnormal    WBC, Urine 1-5      RBC, Urine 1-2      Squamous Epithelial Cells, Urine 1-9 (SPARSE)      Bacteria, Urine 4+ (*)    SERIAL TROPONIN, INITIAL ([facility]) - Normal    Troponin T, High Sensitivity 10     CREATINE KINASE - Normal    Creatine Kinase 42     MAGNESIUM - Normal    Magnesium 2.20     TROPONIN T SERIES, HIGH SENSITIVITY (0, 2 HR, 6 HR)    Narrative:     The following orders were created for panel order Troponin T Series, High Sensitivity (0, 2HR, " 6HR).  Procedure                               Abnormality         Status                     ---------                               -----------         ------                     Serial Troponin, Initial...[[id number]]  Normal              Final result               Serial Troponin, 2 Hour ...[[id number]]                                                   Please view results for these tests on the individual orders.   SERIAL TROPONIN,  2 HOUR ([facility])       As interpreted by me, the above displayed labs are abnormal. All other labs obtained during this visit were within normal range or not returned as of this dictation.      EKG Interpretation  Attending note to interpretation Dr. Coleman  normal sinus rhythm at 71 bpm, normal axis, normal voltage, normal ST segment, and normal T waves         XR chest 2 views   Final Result   No evidence of acute cardiopulmonary process.             MACRO:   None        Signed by: Jamil Anyousmane 10/23/2023 2:14 PM   Dictation workstation:   [id number]      CT head wo IV contrast   Final Result   1. No acute intracranial findings.   2. Left frontoparietal scalp laceration.        MACRO:   None        Signed by: Jamil Anyousmane 10/23/2023 1:05 PM   Dictation workstation:   [id number]      CT cervical spine wo IV contrast   Final Result   1. No acute cervical spine findings.        2. Degenerative changes of the cervical spine, including severe right   foraminal stenosis at C6-C7 without spinal canal stenosis.        MACRO:   None        Signed by: Jamil Anyousmane 10/23/2023 1:03 PM   Dictation workstation:   [id number]              XR chest 2 views   Final Result   No evidence of acute cardiopulmonary process.             MACRO:   None        Signed by: Jamil Anyousmane 10/23/2023 2:14 PM   Dictation workstation:   [id number]      CT head wo IV contrast   Final Result   1. No acute intracranial findings.   2. Left frontoparietal scalp laceration.        MACRO:   None        Signed by: Jamil Anyu 10/23/2023  1:05 PM   Dictation workstation:   [id number]      CT cervical spine wo IV contrast   Final Result   1. No acute cervical spine findings.        2. Degenerative changes of the cervical spine, including severe right   foraminal stenosis at C6-C7 without spinal canal stenosis.        MACRO:   None        Signed by: Jamil Escalera 10/23/2023 1:03 PM   Dictation workstation:   [id number]              ------------------------------ ED COURSE/MEDICAL DECISION MAKING----------------------  Medical Decision Making:   Exam: A medically appropriate exam performed, outlined above, given the known history and presentation.    History obtained from: Patient and medical record      Social Determinants of Health considered during this visit: Lives at home alone      PAST MEDICAL HISTORY/Chronic Conditions Affecting Care     has no past medical history on file. History of COPD, hypertension, arthritis, neuropathy, recurrent falls, hypertension, dyslipidemia      CC/HPI Summary, Social Determinants of health, Records Reviewed, DDx, testing done/not done, ED Course, Reassessment, disposition considerations/shared decision making with patient, consults, disposition:   Patient presented to the emergency department complaints of generalized weakness and shaking.  Status post fall with laceration to the left side of her head  Tylenol  Tetanus  Lidocaine  CT cervical-1. No acute cervical spine findings.    2. Degenerative changes of the cervical spine, including severe right  foraminal stenosis at C6-C7 without spinal canal stenosis.  CT head-1. No acute intracranial findings.  2. Left frontoparietal scalp laceration.  Chest x-ray-No evidence of acute cardiopulmonary process.   EKG-normal sinus rhythm at 71 bpm, normal axis, normal voltage, normal ST segment, and normal T waves   CPK  CBC  CMP  Magnesium  Troponin  Given  Laceration repair  Medical Decision Making/Differential Diagnosis:  Differentials include not limited to electrolyte  abnormality versus anemia versus dehydration versus acute coronary syndrome versus intercranial bleed versus concussion syndrome versus close head injury versus UTI  Upon review.  Patient presented for weakness shaking fall with head laceration.  Acute intracranial findings left frontal parietal scalp laceration which required suture repair please see procedure note.  Tetanus shot updated.  No acute cervical spine injury is noted degenerative changes noted.  Chest x-ray showed no acute cardiopulmonary process.  Patient reports she fell secondary to weakness EKG was obtained normal sinus rhythm per attending note no ST elevation noted no complaints of chest pain.  First troponin 10-second troponin pending.  Electrolytes unremarkable except for sodium 132, chloride 95.  Mild electrolyte imbalance noted.  BUN 29 with a normal creatinine.  Elevated alkaline phosphatase.  Otherwise LFTs within normal limits.  CK is 42.  White blood cell count is 12.4 urine is consistent with UTI with nitrites leukocytes and bacteria will give Rocephin.  Culture pending.  Patient is not anemic.    Patient's family expressed concerns of patient's safety at home.  Reports she is fallen multiple times.  Dropped cigarette box.  Does not get out of bed after her aide leaves for several hours.  Based on her clinical presentation history and symptoms consistent with UTI, close head injury, fall generalized weakness.  Discussed case with Dr. Parmer who is agreed to admit the patient under his service regular nursing floor.  Patient seen and evaluated with attending physician Dr. Coleman  Parts of this chart  have been completed using voice recognition software.  Please excuse any errors of transcription.  Despite the medical decision-making time stamp above medical decision making has taken place during the patient's entire visit.  Review:  Magnesium 2.2  Glucose 144  Sodium 132  Chloride 95  Bicarb 23  BUN 29  Creatinine 0.9  Alkaline phosphatase  153  LFTs within normal limits  Troponin 10  CK 42  White blood cell count 12.4  Hemoglobin 15.2  Urine leukocytes 75+2 nitrates +4 bacteria      PROCEDURES  Unless otherwise noted below, none  Laceration Repair    Performed by: KATHERINE Hickey  Authorized by: Juanita Coleman MD    Consent:     Consent obtained:  Verbal    Consent given by:  Patient    Risks, benefits, and alternatives were discussed: yes      Risks discussed:  Infection, pain, vascular damage, nerve damage, need for additional repair, poor cosmetic result and poor wound healing  Universal protocol:     Procedure explained and questions answered to patient or proxy's satisfaction: yes      Relevant documents present and verified: yes      Test results available: yes      Imaging studies available: yes      Required blood products, implants, devices, and special equipment available: yes      Site/side marked: yes      Immediately prior to procedure, a time out was called: yes      Patient identity confirmed:  Verbally with patient and arm band  Anesthesia:     Anesthesia method:  Local infiltration    Local anesthetic:  Lidocaine 1% w/o epi  Laceration details:     Location:  Scalp    Scalp location:  Frontal    Length (cm):  5  Pre-procedure details:     Preparation:  Patient was prepped and draped in usual sterile fashion  Exploration:     Hemostasis achieved with:  Direct pressure    Imaging outcome: foreign body not noted      Wound exploration: wound explored through full range of motion and entire depth of wound visualized      Contaminated: no    Treatment:     Area cleansed with:  Chlorhexidine    Amount of cleaning:  Extensive    Irrigation solution:  Sterile saline    Irrigation volume:  Copious amounts    Irrigation method:  Syringe    Debridement:  None  Skin repair:     Repair method:  Sutures    Suture size:  3-0    Suture material:  Nylon    Suture technique:  Simple interrupted    Number of sutures:  15  Approximation:      Approximation:  Close  Repair type:     Repair type:  Intermediate  Post-procedure details:     Dressing:  Antibiotic ointment    Procedure completion:  Tolerated well, no immediate complications  Comments:      Patient placed in position of comfort in a chair.    Left frontal area gaping wound flap like 5 cm  Number of sutures: Eleven 3-0 Ethilon due to no 4.0 available  In 4 Vicryl 4-0   Allergies reviewed  Tetanus shot updated today  Procedure explained to patient verbalizes understanding.  Sterilely draped.  Wound was cleansed with chlorhexidine.  Using 1% lidocaine without epinephrine approximately 3 cc was injected into the wound edges to tobacco anesthesia.  Wound was irrigated with copious amounts of normal saline.  No foreign body noted.  Galea appears to be intact.  Using 4-0 Vicryl 4 simple directed sutures were used to closely approximate the wound.  Then using eleven 3-0 Ethilon the wound was closely approximated.  Triple antibiotic ointment applied by nursing.  Neurovascular status remained the same before and after procedure.  Patient able to open and shut the eye with no difficulty.  Patient vies have sutures removed in 7 to 10 days okay to wash hair with warm water today but do not soak in water.  Can wash with shampoo tomorrow         CONSULTS:   None      ED Course as of 10/23/23 1809   Mon Oct 23, 2023   1748 Patient's daughter called and reported the patient is not safe to go home.  She is dropping cigarettes she is falling multiple times.  Is refusing to come pick her up because she does not feel comfortable taking patient home. [TB]   6680 Abbi patient's family concerns with her..  Patient states that we were talking to her SILVER Razia.  Is a time verbalizes understanding is agreeable to stay in the hospital for further evaluation and treatment of her generalized weakness UTI close head injury [TB]   0817 Spoke with Dr. Pearce.  Who is agreed to accept the patient under his service regular  nursing floor.  Impression UTI, generalized weakness, mild electrolyte imbalance mild hyponatremia [TB]   1808 Discussed case with patient's SILVER Randle.  She reiterated her concern that she spoke with nursing earlier about patient is dropping cigarettes in the home.  She is not able to get up and get herself to drink water or cook for herself.  She lays in bed until her aide comes in.  Reports that the dogs in the house are destroying the home because she is not able to take care of them.  She has fallen multiple times she [facility] her in the next week. [TB]      ED Course User Index  [TB] KATHERINE Hickey         Diagnoses as of 10/23/23 1809   Closed head injury, initial encounter   Laceration of scalp, initial encounter   Acute cystitis without hematuria   Generalized weakness   Fall, initial encounter   Functional assessment declined         This patient has remained hemodynamically stable during their ED course.      Critical Care: none        Counseling:  The emergency provider has spoken with the patient and patient's SILVER Randle and discussed today’s results, in addition to providing specific details for the plan of care and counseling regarding the diagnosis and prognosis.  Questions are answered at this time and they are agreeable with the plan.         --------------------------------- IMPRESSION AND DISPOSITION ---------------------------------    IMPRESSION  No diagnosis found.    DISPOSITION  Disposition: Admission to the hospital  Patient condition is stable        NOTE: This report was transcribed using voice recognition software. Every effort was made to ensure accuracy; however, inadvertent computerized transcription errors may be present      KATHERINE Hickey  10/23/23 1802       KATHERINE Hickey  10/23/23 1809

## 2023-10-23 NOTE — PROGRESS NOTES
Attestation note/supervisory note for LEXIS Mikayla      The patient is a 68-year-old female presenting to the emergency department by EMS from home for evaluation of a fall with a head injury.  The patient reports that she has chronic generalized weakness and sciatica.  She states that in the past 5 days she is just felt progressively weaker.  She states that she did have a fall today sometime around 0830 she hit her head on a jacqueline cabinet as she feel.  She states that she was just trying to get out of her wheelchair and her legs were just too weak to support her.  She denies any loss of consciousness.  She denies the use of any blood thinners.  Unknown date of last tetanus.  She states that she does have a little bit of pain on the left side of her head where she hit her head.  She denies any visual changes.  No neck or back pain.  No chest pain or shortness of breath.  No abdominal pain.  No nausea or vomiting.  No diarrhea or constipation but no urinary complaints.  All pertinent positives and negatives are recorded above.  All other systems reviewed and otherwise negative.  Vital signs with mild hypertension but otherwise within normal limits.  Physical exam with a well-nourished well-developed female with obesity but no evidence of acute distress.  She does have a disheveled appearance and poor hygiene.  HEENT exam with a laceration to the left side of her forehead near the hairline.  She has no midface instability.  No septal hematomas.  No hemotympanum.  She has no focal midline neck or back pain with palpation.  She has no evidence of airway compromise or respiratory distress.  Abdominal exam is benign.  She has some generalized weakness 4 extremities but no focal motor, neurologic or vascular deficits on exam.  NIH stroke scale score of 0.      EKG with normal sinus rhythm at 71 bpm, normal axis, normal voltage, normal ST segment, and normal T waves      Oral acetaminophen, topical let and tetanus  ordered.      Wound care provided by nursing staff.      Diagnostic labs with mild leukocytosis, mild electrolyte imbalance, but otherwise unremarkable      Initial trop T 10.       Repeat trop T and UA pending at the time of my departure      Chest x-ray  IMPRESSION:  No evidence of acute cardiopulmonary process.      CT head  IMPRESSION:  1. No acute intracranial findings.  2. Left frontoparietal scalp laceration.      CT C-spine  IMPRESSION:  1. No acute cervical spine findings.  2. Degenerative changes of the cervical spine, including severe right  foraminal stenosis at C6-C7 without spinal canal stenosis.      Laceration was repaired by LEXIS Degroot without complication.      The results of the repeat Troponin T, urinalysis, and trial of ambulation were pending at the time of my departure.  LEXIS Degroot will manage patient primarily.  Anticipate disposition based on the results of these.  If these results do not show any indication for admission and/or transfer, anticipate that the patient will be released to go home with close outpatient follow-up with her primary care physician.  If the patient is not able to complete a trial of ambulation prior to release, she may need to be placed for rehab.        I personally saw the patient and performed a substantive portion of the visit including all aspects of the medical decision making.      I reviewed the results of the diagnostic labs and diagnostic imaging.  Formal radiology reading was completed by the radiologist      Juanita Coleman MD

## 2023-10-24 LAB
ALBUMIN SERPL-MCNC: 3.9 G/DL (ref 3.5–5)
ALP BLD-CCNC: 136 U/L (ref 35–125)
ALT SERPL-CCNC: 28 U/L (ref 5–40)
ANION GAP SERPL CALC-SCNC: 12 MMOL/L
AST SERPL-CCNC: 17 U/L (ref 5–40)
BILIRUB SERPL-MCNC: 0.4 MG/DL (ref 0.1–1.2)
BUN SERPL-MCNC: 28 MG/DL (ref 8–25)
CALCIUM SERPL-MCNC: 9.1 MG/DL (ref 8.5–10.4)
CHLORIDE SERPL-SCNC: 100 MMOL/L (ref 97–107)
CO2 SERPL-SCNC: 21 MMOL/L (ref 24–31)
CREAT SERPL-MCNC: 0.8 MG/DL (ref 0.4–1.6)
ERYTHROCYTE [DISTWIDTH] IN BLOOD BY AUTOMATED COUNT: 14.4 % (ref 11.5–14.5)
GFR SERPL CREATININE-BSD FRML MDRD: 80 ML/MIN/1.73M*2
GLUCOSE SERPL-MCNC: 96 MG/DL (ref 65–99)
HCT VFR BLD AUTO: 40.8 % (ref 36–46)
HGB BLD-MCNC: 13.8 G/DL (ref 12–16)
MCH RBC QN AUTO: 31.3 PG (ref 26–34)
MCHC RBC AUTO-ENTMCNC: 33.8 G/DL (ref 32–36)
MCV RBC AUTO: 93 FL (ref 80–100)
NRBC BLD-RTO: 0 /100 WBCS (ref 0–0)
PLATELET # BLD AUTO: 246 X10*3/UL (ref 150–450)
PMV BLD AUTO: 10.9 FL (ref 7.5–11.5)
POTASSIUM SERPL-SCNC: 3.9 MMOL/L (ref 3.4–5.1)
PROT SERPL-MCNC: 6.5 G/DL (ref 5.9–7.9)
RBC # BLD AUTO: 4.41 X10*6/UL (ref 4–5.2)
SODIUM SERPL-SCNC: 133 MMOL/L (ref 133–145)
WBC # BLD AUTO: 9.7 X10*3/UL (ref 4.4–11.3)

## 2023-10-24 PROCEDURE — 80053 COMPREHEN METABOLIC PANEL: CPT | Performed by: INTERNAL MEDICINE

## 2023-10-24 PROCEDURE — 97161 PT EVAL LOW COMPLEX 20 MIN: CPT | Mod: GP

## 2023-10-24 PROCEDURE — 2500000002 HC RX 250 W HCPCS SELF ADMINISTERED DRUGS (ALT 637 FOR MEDICARE OP, ALT 636 FOR OP/ED): Performed by: INTERNAL MEDICINE

## 2023-10-24 PROCEDURE — 2500000005 HC RX 250 GENERAL PHARMACY W/O HCPCS: Performed by: NURSE PRACTITIONER

## 2023-10-24 PROCEDURE — 2500000001 HC RX 250 WO HCPCS SELF ADMINISTERED DRUGS (ALT 637 FOR MEDICARE OP): Performed by: INTERNAL MEDICINE

## 2023-10-24 PROCEDURE — 97116 GAIT TRAINING THERAPY: CPT | Mod: GP

## 2023-10-24 PROCEDURE — 85027 COMPLETE CBC AUTOMATED: CPT | Performed by: INTERNAL MEDICINE

## 2023-10-24 PROCEDURE — 36415 COLL VENOUS BLD VENIPUNCTURE: CPT | Performed by: INTERNAL MEDICINE

## 2023-10-24 PROCEDURE — 96372 THER/PROPH/DIAG INJ SC/IM: CPT | Performed by: INTERNAL MEDICINE

## 2023-10-24 PROCEDURE — 2500000004 HC RX 250 GENERAL PHARMACY W/ HCPCS (ALT 636 FOR OP/ED): Performed by: INTERNAL MEDICINE

## 2023-10-24 PROCEDURE — 1200000002 HC GENERAL ROOM WITH TELEMETRY DAILY

## 2023-10-24 PROCEDURE — 94640 AIRWAY INHALATION TREATMENT: CPT

## 2023-10-24 PROCEDURE — 9420000001 HC RT PATIENT EDUCATION 5 MIN

## 2023-10-24 RX ORDER — ALBUTEROL SULFATE 0.83 MG/ML
2.5 SOLUTION RESPIRATORY (INHALATION) EVERY 2 HOUR PRN
Status: DISCONTINUED | OUTPATIENT
Start: 2023-10-24 | End: 2023-10-27 | Stop reason: HOSPADM

## 2023-10-24 RX ORDER — HYDROXYZINE PAMOATE 25 MG/1
25 CAPSULE ORAL DAILY PRN
Status: DISCONTINUED | OUTPATIENT
Start: 2023-10-24 | End: 2023-10-27 | Stop reason: HOSPADM

## 2023-10-24 RX ORDER — LIDOCAINE 560 MG/1
1 PATCH PERCUTANEOUS; TOPICAL; TRANSDERMAL DAILY
Status: DISCONTINUED | OUTPATIENT
Start: 2023-10-24 | End: 2023-10-27 | Stop reason: HOSPADM

## 2023-10-24 RX ORDER — CARBAMAZEPINE 200 MG/1
600 TABLET ORAL
Status: DISCONTINUED | OUTPATIENT
Start: 2023-10-24 | End: 2023-10-27 | Stop reason: HOSPADM

## 2023-10-24 RX ORDER — ESTRADIOL 0.1 MG/G
0.5 CREAM VAGINAL 2 TIMES WEEKLY
Status: DISCONTINUED | OUTPATIENT
Start: 2023-10-24 | End: 2023-10-27 | Stop reason: HOSPADM

## 2023-10-24 RX ADMIN — GABAPENTIN 900 MG: 300 CAPSULE ORAL at 15:23

## 2023-10-24 RX ADMIN — Medication 3 MG: at 02:18

## 2023-10-24 RX ADMIN — BISOPROLOL FUMARATE 5 MG: 5 TABLET, FILM COATED ORAL at 09:26

## 2023-10-24 RX ADMIN — HYDRALAZINE HYDROCHLORIDE 10 MG: 10 TABLET, FILM COATED ORAL at 09:19

## 2023-10-24 RX ADMIN — GABAPENTIN 900 MG: 300 CAPSULE ORAL at 21:21

## 2023-10-24 RX ADMIN — HEPARIN SODIUM 5000 UNITS: 5000 INJECTION, SOLUTION INTRAVENOUS; SUBCUTANEOUS at 02:18

## 2023-10-24 RX ADMIN — HYDRALAZINE HYDROCHLORIDE 10 MG: 10 TABLET, FILM COATED ORAL at 02:15

## 2023-10-24 RX ADMIN — VENLAFAXINE HYDROCHLORIDE 300 MG: 150 CAPSULE, EXTENDED RELEASE ORAL at 09:19

## 2023-10-24 RX ADMIN — CARBAMAZEPINE 600 MG: 200 TABLET ORAL at 17:16

## 2023-10-24 RX ADMIN — HYDRALAZINE HYDROCHLORIDE 10 MG: 10 TABLET, FILM COATED ORAL at 21:21

## 2023-10-24 RX ADMIN — GABAPENTIN 900 MG: 300 CAPSULE ORAL at 09:19

## 2023-10-24 RX ADMIN — TIOTROPIUM BROMIDE INHALATION SPRAY 2 PUFF: 3.12 SPRAY, METERED RESPIRATORY (INHALATION) at 08:18

## 2023-10-24 RX ADMIN — ALBUTEROL SULFATE 2.5 MG: 2.5 SOLUTION RESPIRATORY (INHALATION) at 05:00

## 2023-10-24 RX ADMIN — HEPARIN SODIUM 5000 UNITS: 5000 INJECTION, SOLUTION INTRAVENOUS; SUBCUTANEOUS at 15:18

## 2023-10-24 RX ADMIN — BUSPIRONE HYDROCHLORIDE 15 MG: 5 TABLET ORAL at 09:19

## 2023-10-24 RX ADMIN — BUSPIRONE HYDROCHLORIDE 15 MG: 5 TABLET ORAL at 02:18

## 2023-10-24 RX ADMIN — DOCUSATE SODIUM 100 MG: 100 CAPSULE, LIQUID FILLED ORAL at 02:25

## 2023-10-24 RX ADMIN — ATORVASTATIN CALCIUM 40 MG: 40 TABLET, FILM COATED ORAL at 02:18

## 2023-10-24 RX ADMIN — FLUTICASONE FUROATE AND VILANTEROL TRIFENATATE 1 PUFF: 200; 25 POWDER RESPIRATORY (INHALATION) at 08:17

## 2023-10-24 RX ADMIN — CARBAMAZEPINE 600 MG: 200 TABLET ORAL at 09:20

## 2023-10-24 RX ADMIN — HYDRALAZINE HYDROCHLORIDE 10 MG: 10 TABLET, FILM COATED ORAL at 15:18

## 2023-10-24 RX ADMIN — HYDROCODONE BITARTRATE AND ACETAMINOPHEN 1 TABLET: 5; 325 TABLET ORAL at 21:22

## 2023-10-24 RX ADMIN — Medication 2000 UNITS: at 09:20

## 2023-10-24 RX ADMIN — FORMOTEROL FUMARATE DIHYDRATE 20 MCG: 20 SOLUTION RESPIRATORY (INHALATION) at 05:02

## 2023-10-24 RX ADMIN — CEFTRIAXONE SODIUM 1 G: 1 INJECTION, SOLUTION INTRAVENOUS at 17:16

## 2023-10-24 RX ADMIN — ATORVASTATIN CALCIUM 40 MG: 40 TABLET, FILM COATED ORAL at 21:22

## 2023-10-24 RX ADMIN — AMLODIPINE BESYLATE 10 MG: 10 TABLET ORAL at 09:20

## 2023-10-24 RX ADMIN — ASPIRIN 81 MG: 81 TABLET, COATED ORAL at 09:19

## 2023-10-24 RX ADMIN — GABAPENTIN 900 MG: 300 CAPSULE ORAL at 02:18

## 2023-10-24 RX ADMIN — HEPARIN SODIUM 5000 UNITS: 5000 INJECTION, SOLUTION INTRAVENOUS; SUBCUTANEOUS at 06:25

## 2023-10-24 RX ADMIN — LIDOCAINE 1 PATCH: 560 PATCH PERCUTANEOUS; TOPICAL; TRANSDERMAL at 18:39

## 2023-10-24 RX ADMIN — HEPARIN SODIUM 5000 UNITS: 5000 INJECTION, SOLUTION INTRAVENOUS; SUBCUTANEOUS at 23:23

## 2023-10-24 RX ADMIN — DOCUSATE SODIUM 100 MG: 100 CAPSULE, LIQUID FILLED ORAL at 21:22

## 2023-10-24 RX ADMIN — Medication 3 MG: at 21:22

## 2023-10-24 RX ADMIN — HYDROCODONE BITARTRATE AND ACETAMINOPHEN 1 TABLET: 5; 325 TABLET ORAL at 15:23

## 2023-10-24 RX ADMIN — PANTOPRAZOLE SODIUM 40 MG: 40 TABLET, DELAYED RELEASE ORAL at 06:25

## 2023-10-24 RX ADMIN — BUSPIRONE HYDROCHLORIDE 15 MG: 5 TABLET ORAL at 21:21

## 2023-10-24 SDOH — ECONOMIC STABILITY: FOOD INSECURITY: WITHIN THE PAST 12 MONTHS, THE FOOD YOU BOUGHT JUST DIDN'T LAST AND YOU DIDN'T HAVE MONEY TO GET MORE.: NEVER TRUE

## 2023-10-24 SDOH — SOCIAL STABILITY: SOCIAL INSECURITY
WITHIN THE LAST YEAR, HAVE TO BEEN RAPED OR FORCED TO HAVE ANY KIND OF SEXUAL ACTIVITY BY YOUR PARTNER OR EX-PARTNER?: NO

## 2023-10-24 SDOH — HEALTH STABILITY: MENTAL HEALTH: HOW OFTEN DO YOU HAVE 6 OR MORE DRINKS ON ONE OCCASION?: NEVER

## 2023-10-24 SDOH — ECONOMIC STABILITY: INCOME INSECURITY: IN THE PAST 12 MONTHS, HAS THE ELECTRIC, GAS, OIL, OR WATER COMPANY THREATENED TO SHUT OFF SERVICE IN YOUR HOME?: NO

## 2023-10-24 SDOH — ECONOMIC STABILITY: FOOD INSECURITY: WITHIN THE PAST 12 MONTHS, YOU WORRIED THAT YOUR FOOD WOULD RUN OUT BEFORE YOU GOT MONEY TO BUY MORE.: NEVER TRUE

## 2023-10-24 SDOH — SOCIAL STABILITY: SOCIAL NETWORK
DO YOU BELONG TO ANY CLUBS OR ORGANIZATIONS SUCH AS CHURCH GROUPS UNIONS, FRATERNAL OR ATHLETIC GROUPS, OR SCHOOL GROUPS?: NO

## 2023-10-24 SDOH — HEALTH STABILITY: MENTAL HEALTH: HOW OFTEN DO YOU HAVE A DRINK CONTAINING ALCOHOL?: NEVER

## 2023-10-24 SDOH — SOCIAL STABILITY: SOCIAL NETWORK: HOW OFTEN DO YOU ATTENT MEETINGS OF THE CLUB OR ORGANIZATION YOU BELONG TO?: NEVER

## 2023-10-24 SDOH — SOCIAL STABILITY: SOCIAL NETWORK: HOW OFTEN DO YOU GET TOGETHER WITH FRIENDS OR RELATIVES?: MORE THAN THREE TIMES A WEEK

## 2023-10-24 SDOH — SOCIAL STABILITY: SOCIAL NETWORK: HOW OFTEN DO YOU ATTEND CHURCH OR RELIGIOUS SERVICES?: NEVER

## 2023-10-24 SDOH — SOCIAL STABILITY: SOCIAL INSECURITY
WITHIN THE LAST YEAR, HAVE YOU BEEN KICKED, HIT, SLAPPED, OR OTHERWISE PHYSICALLY HURT BY YOUR PARTNER OR EX-PARTNER?: NO

## 2023-10-24 SDOH — HEALTH STABILITY: MENTAL HEALTH
STRESS IS WHEN SOMEONE FEELS TENSE, NERVOUS, ANXIOUS, OR CAN'T SLEEP AT NIGHT BECAUSE THEIR MIND IS TROUBLED. HOW STRESSED ARE YOU?: TO SOME EXTENT

## 2023-10-24 SDOH — HEALTH STABILITY: MENTAL HEALTH: HOW MANY STANDARD DRINKS CONTAINING ALCOHOL DO YOU HAVE ON A TYPICAL DAY?: PATIENT DOES NOT DRINK

## 2023-10-24 SDOH — SOCIAL STABILITY: SOCIAL INSECURITY: WITHIN THE LAST YEAR, HAVE YOU BEEN HUMILIATED OR EMOTIONALLY ABUSED IN OTHER WAYS BY YOUR PARTNER OR EX-PARTNER?: NO

## 2023-10-24 SDOH — SOCIAL STABILITY: SOCIAL INSECURITY: WITHIN THE LAST YEAR, HAVE YOU BEEN AFRAID OF YOUR PARTNER OR EX-PARTNER?: NO

## 2023-10-24 SDOH — SOCIAL STABILITY: SOCIAL NETWORK: ARE YOU MARRIED, WIDOWED, DIVORCED, SEPARATED, NEVER MARRIED, OR LIVING WITH A PARTNER?: NEVER MARRIED

## 2023-10-24 SDOH — SOCIAL STABILITY: SOCIAL NETWORK
IN A TYPICAL WEEK, HOW MANY TIMES DO YOU TALK ON THE PHONE WITH FAMILY, FRIENDS, OR NEIGHBORS?: MORE THAN THREE TIMES A WEEK

## 2023-10-24 ASSESSMENT — PAIN - FUNCTIONAL ASSESSMENT
PAIN_FUNCTIONAL_ASSESSMENT: 0-10
PAIN_FUNCTIONAL_ASSESSMENT: FLACC (FACE, LEGS, ACTIVITY, CRY, CONSOLABILITY)
PAIN_FUNCTIONAL_ASSESSMENT: 0-10

## 2023-10-24 ASSESSMENT — COGNITIVE AND FUNCTIONAL STATUS - GENERAL
WALKING IN HOSPITAL ROOM: A LITTLE
MOBILITY SCORE: 17
DAILY ACTIVITIY SCORE: 19
TOILETING: A LITTLE
TOILETING: A LITTLE
PERSONAL GROOMING: A LITTLE
DRESSING REGULAR UPPER BODY CLOTHING: A LITTLE
CLIMB 3 TO 5 STEPS WITH RAILING: A LOT
TOILETING: A LITTLE
HELP NEEDED FOR BATHING: A LOT
CLIMB 3 TO 5 STEPS WITH RAILING: A LOT
DRESSING REGULAR UPPER BODY CLOTHING: A LITTLE
MOBILITY SCORE: 17
DAILY ACTIVITIY SCORE: 16
HELP NEEDED FOR BATHING: A LITTLE
WALKING IN HOSPITAL ROOM: A LITTLE
DRESSING REGULAR LOWER BODY CLOTHING: A LITTLE
TURNING FROM BACK TO SIDE WHILE IN FLAT BAD: A LITTLE
TURNING FROM BACK TO SIDE WHILE IN FLAT BAD: A LITTLE
HELP NEEDED FOR BATHING: A LITTLE
MOVING FROM LYING ON BACK TO SITTING ON SIDE OF FLAT BED WITH BEDRAILS: A LITTLE
DRESSING REGULAR LOWER BODY CLOTHING: A LITTLE
DAILY ACTIVITIY SCORE: 19
MOBILITY SCORE: 20
WALKING IN HOSPITAL ROOM: A LOT
MOVING TO AND FROM BED TO CHAIR: A LITTLE
EATING MEALS: A LITTLE
PERSONAL GROOMING: A LITTLE
MOVING TO AND FROM BED TO CHAIR: A LITTLE
DRESSING REGULAR LOWER BODY CLOTHING: A LOT
DRESSING REGULAR UPPER BODY CLOTHING: A LITTLE
CLIMB 3 TO 5 STEPS WITH RAILING: A LITTLE
STANDING UP FROM CHAIR USING ARMS: A LITTLE
PERSONAL GROOMING: A LITTLE
STANDING UP FROM CHAIR USING ARMS: A LITTLE
MOVING TO AND FROM BED TO CHAIR: A LITTLE
STANDING UP FROM CHAIR USING ARMS: A LITTLE

## 2023-10-24 ASSESSMENT — ACTIVITIES OF DAILY LIVING (ADL)
BATHING_ASSISTANCE: MODERATE
ADL_ASSISTANCE: NEEDS ASSISTANCE

## 2023-10-24 ASSESSMENT — LIFESTYLE VARIABLES
AUDIT-C TOTAL SCORE: 0
SKIP TO QUESTIONS 9-10: 1

## 2023-10-24 ASSESSMENT — PAIN SCALES - GENERAL
PAINLEVEL_OUTOF10: 9
PAINLEVEL_OUTOF10: 6
PAINLEVEL_OUTOF10: 6
PAINLEVEL_OUTOF10: 4
PAINLEVEL_OUTOF10: 10 - WORST POSSIBLE PAIN
PAINLEVEL_OUTOF10: 10 - WORST POSSIBLE PAIN
PAINLEVEL_OUTOF10: 0 - NO PAIN

## 2023-10-24 NOTE — CARE PLAN
Problem: Respiratory  Goal: Minimize anxiety/maximize coping throughout shift  Outcome: Progressing  Goal: No signs of respiratory distress (eg. Use of accessory muscles. Peds grunting)  Outcome: Progressing  Goal: Verbalize decreased shortness of breath this shift  Outcome: Progressing

## 2023-10-24 NOTE — PROGRESS NOTES
Occupational Therapy    Evaluation/Treatment    Patient Name: Babar Quigley  MRN: [id number]  : 1955  Today's Date: 10/24/23  Time Calculation  Start Time: 1400  Stop Time: 1430  Time Calculation (min): 30 min       Assessment:  OT Assessment: OT order received, chart reviewed, evaluation completed. Pt demonstrated impaired cognition, safety, insight, ADLs and functional mobility and would benefit from acute OT services  Prognosis: Good  Barriers to Discharge: Decreased caregiver support (falls when aids not available)  Evaluation/Treatment Tolerance: Patient limited by fatigue  Medical Staff Made Aware: Yes  End of Session Communication: Bedside nurse  End of Session Patient Position: Bed, 2 rail up, Alarm on  OT Assessment Results: Decreased ADL status, Decreased upper extremity strength, Decreased safe judgment during ADL, Decreased cognition, Decreased sensation, Decreased endurance, Decreased functional mobility, Decreased IADLs  Prognosis: Good  Barriers to Discharge: Decreased caregiver support (falls when aids not available)  Evaluation/Treatment Tolerance: Patient limited by fatigue  Medical Staff Made Aware: Yes  Strengths: Attitude of self  Barriers to Participation:  (insight)  Plan:  Treatment Interventions: ADL retraining, Functional transfer training, UE strengthening/ROM, Endurance training, Cognitive reorientation, Equipment evaluation/education, Patient/family training  OT Frequency: 3 times per week  OT Discharge Recommendations: Moderate intensity level of continued care  Equipment Recommended upon Discharge: Wheeled walker  OT Recommended Transfer Status: Minimal assist  Treatment Interventions: ADL retraining, Functional transfer training, UE strengthening/ROM, Endurance training, Cognitive reorientation, Equipment evaluation/education, Patient/family training    Subjective   Current Problem:  1. Closed head injury, initial encounter        2. Laceration of scalp, initial encounter         3. Acute cystitis without hematuria        4. Generalized weakness        5. Fall, initial encounter        6. Functional assessment declined          General:   OT Received On: 10/24/23  General  Reason for Referral: activities of daily living  Referred By: Portia  Past Medical History Relevant to Rehab: COPD, HTN, arthritis, nueropathy, recurrent falls, dyslipidemia, anxiety, UTI, spine surgery  Prior to Session Communication: Bedside nurse  Patient Position Received: Bed, 2 rail up, Alarm on  Preferred Learning Style: auditory  General Comment: 68 year old female admit from home due to weakness and fall.  Precautions:  Medical Precautions: Fall precautions  Vital Signs:     Pain:  Pain Assessment  Pain Assessment: 0-10  Pain Score: 10 - Worst possible pain  Pain Type: Chronic pain  Pain Location: Back  Pain Orientation: Right  Pain Interventions: Repositioned    Objective   Cognition:  Overall Cognitive Status: Impaired  Safety Judgment: Decreased awareness of need for assistance  Attention: Within Functional Limits  Memory: Within Funtional Limits  Problem Solving:  (impaired)  Safety/Judgement:  (impaired)  Insight: Moderate  Impulsive: Mildly           Home Living:  Type of Home: Apartment  Lives With: Alone  Home Adaptive Equipment: Walker rolling or standard, Wheelchair-manual, Cane  Home Layout: One level  Home Access: Level entry  Bathroom Shower/Tub: Tub/shower unit, Walk-in shower  Bathroom Toilet: Standard  Bathroom Equipment: Grab bars in shower, Shower chair with back  Home Living Comments: reports HHC 6 hrs/day none on weekends, multiple falls when aids aren't there  Prior Function:  Level of [place]: Needs assistance with homemaking, Needs assistance with ADLs  Receives Help From: Personal care attendant  ADL Assistance: Needs assistance  Homemaking Assistance: Needs assistance  Ambulatory Assistance: Independent (with RW, only has been using wheelchair recently due to hip/back pain)  IADL  "History:     ADL:  Eating Deficit: Setup  Grooming Deficit: Setup  Bathing Assistance: Moderate  UE Dressing Assistance: Minimal  LE Dressing Assistance: Moderate  Toileting Assistance with Device: Moderate  Activity Tolerance:  Endurance: Decreased tolerance for upright activites    Bed Mobility/Transfers: Bed Mobility  Bed Mobility: Yes  Bed Mobility 1  Bed Mobility 1: Sitting to supine  Level of Assistance 1: Close supervision  Bed Mobility Comments 1: Cues for positioning, pt with needs in reach bed alarm on for safety.    Transfers  Transfer: Yes  Transfer 1  Transfer From 1: Chair with arms to  Transfer to 1: Stand  Technique 1: Sit to stand  Transfer Device 1: Walker  Transfer Level of Assistance 1: Minimum assistance  Trials/Comments 1: Pt stood fro chair with min A and RW support and VCs for safe hand placement. Pt performed functional mobility household distance returning to bed in room. Rurned to supine with needs in reach. Bed alarm on.    Vision:Vision - Basic Assessment  Current Vision: Wears glasses all the time  Sensation:  Light Touch: Partial deficits in the RUE, Partial deficits in the LUE (fingertips neuropathy, reports accidentally burning self with cigarettes)  Strength:  Strength Comments: generalized weakness    Coordination:  Movements are Fluid and Coordinated: No  Upper Body Coordination: slower rate of movements, reports had \"jerking movements\" prior   Hand Function:  Hand Function  Gross Grasp: Functional  Coordination: Functional  Extremities: RUE   RUE : Within Functional Limits and LUE   LUE: Within Functional Limits    Outcome Measures: [facility] Daily Activity  Putting on and taking off regular lower body clothing: A lot  Bathing (including washing, rinsing, drying): A lot  Putting on and taking off regular upper body clothing: A little  Toileting, which includes using toilet, bedpan or urinal: A little  Taking care of personal grooming such as brushing teeth: A little  Eating Meals: A " little  Daily Activity - Total Score: 16      Education Documentation  ADL Training, taught by Becca Estevez OT at 10/24/2023  2:58 PM.  Learner: Patient  Readiness: Acceptance  Method: Explanation  Response: Verbalizes Understanding  Comment: educated on POC    Education Comments  No comments found.      Goals:  Encounter Problems       Encounter Problems (Active)       OT Goals       Pt will perform functional mobility household distance at mod ind level with RW  (Progressing)       Start:  10/24/23    Expected End:  11/10/23            Pt will complete ADL tasks with Mod I, using AE as needed, in order to complete self-care tasks.  (Progressing)       Start:  10/24/23    Expected End:  11/10/23            Pt will perform upper body therapeutic exercises all joints/planes of motion independently (Progressing)       Start:  10/24/23    Expected End:  11/10/23            Pt will perform functional transfers at mod ind level with RW  (Progressing)       Start:  10/24/23    Expected End:  11/10/23

## 2023-10-24 NOTE — H&P
History Of Present Illness  Babar Quigley is a 68 y.o. female presenting with patient is 15-year-old  female who came to the emergency room with a complaint of fall and head injury.  Patient has chronic generalized weakness and sciatica.  Patient said that she is bedbound and wheelchair-bound.  She can take few steps with a walker but she is not able to do for now.  Patient said that she was trying to get out of her wheelchair and her legs give out and she fell on the floor.  She has weakness in the extremities.  Patient did complain of little pain in the left side of her head where she hit her head.  Patient denied any loss of consciousness.  Patient denies any chest pain, shortness of breath, cough, expectoration, fever, chills, rigors, odynophagia, dysphagia or recent change in weight or appetite.  Patient is not on any blood thinners.  Past Medical History  Past Medical History:   Diagnosis Date    Arthritis     COPD (chronic obstructive pulmonary disease) (CMS/HCC)     Hypertension        Surgical History  Past Surgical History:   Procedure Laterality Date    MR HEAD ANGIO WO IV CONTRAST  02/22/2022    MR HEAD ANGIO WO IV CONTRAST [facility]    MR HEAD ANGIO WO IV CONTRAST  02/22/2022    MR HEAD ANGIO WO IV CONTRAST [facility]    MR HEAD ANGIO WO IV CONTRAST  04/28/2023    MR HEAD ANGIO WO IV CONTRAST [facility]    MR NECK ANGIO WO IV CONTRAST  02/22/2022    MR NECK ANGIO WO IV CONTRAST [facility]    MR NECK ANGIO WO IV CONTRAST  04/28/2023    MR NECK ANGIO WO IV CONTRAST [facility]    TONSILLECTOMY          Social History  She reports that she has been smoking cigarettes. She has a 100.00 pack-year smoking history. She has quit using smokeless tobacco. She reports that she does not currently use alcohol. She reports that she does not use drugs.    Family History  No family history on file.     Allergies  Pregabalin and  Sulfamethoxazole-trimethoprim    Review of Systems  I reviewed all systems reviewed as above otherwise is negative.  Physical Exam  HEENT:  Head externally atraumatic, no pallor, no icterus, extraocular movements intact, pupils reactive to light, oral mucosa moist and throat clear.  Neck:  Supple, no JVP, no palpable adenopathy or thyromegaly.  No carotid bruit.  Chest:  Clear to auscultation and resonant.  Heart:  Regular rate and rhythm, no murmur or gallop could be appreciated.  Abdomen:  Soft, nontender, bowel sounds present, normoactive, no palpable hepatosplenomegaly.  Extremities:  No edema, pulses present, no cyanosis or clubbing.  CNS:  Patient alert, oriented to time, place and person.  Patient has generalized weakness.  Power in the upper extremities is 5 out of 5 and lower extremity 4 /5.  Patient is got more weakness in right leg.., cranial nerves 2-12 grossly intact.  Skin:  No active rash.  Musculoskeletal:  No joint swelling or erythema, range of movement normal.  Last Recorded Vitals  Heart Rate:  [63-82]   Temp:  [36.4 °C (97.5 °F)-37.1 °C (98.8 °F)]   Resp:  [16-18]   BP: (123-164)/(55-88)   SpO2:  [95 %-99 %]       Relevant Results        Results for orders placed or performed during the hospital encounter of 10/23/23 (from the past 24 hour(s))   Urinalysis with Reflex Microscopic   Result Value Ref Range    Color, Urine Yellow Light-Yellow, Yellow, Dark-Yellow    Appearance, Urine Turbid (N) Clear    Specific Gravity, Urine 1.025 1.005 - 1.035    pH, Urine 5.5 5.0, 5.5, 6.0, 6.5, 7.0, 7.5, 8.0    Protein, Urine 50 (1+) (A) NEGATIVE, 10 (TRACE), 20 (TRACE) mg/dL    Glucose, Urine Normal Normal mg/dL    Blood, Urine NEGATIVE NEGATIVE    Ketones, Urine NEGATIVE NEGATIVE mg/dL    Bilirubin, Urine NEGATIVE NEGATIVE    Urobilinogen, Urine Normal Normal mg/dL    Nitrite, Urine 2+ (A) NEGATIVE    Leukocyte Esterase, Urine 75 Bernice/µL (A) NEGATIVE   Microscopic Only, Urine   Result Value Ref Range    WBC,  Urine 1-5 1-5, NONE /HPF    RBC, Urine 1-2 NONE, 1-2, 3-5 /HPF    Squamous Epithelial Cells, Urine 1-9 (SPARSE) Reference range not established. /HPF    Bacteria, Urine 4+ (A) NONE SEEN /HPF   Serial Troponin, 2 Hour ([facility])   Result Value Ref Range    Troponin T, High Sensitivity 9 <=15 ng/L   Serial Troponin, 6 Hour ([facility])   Result Value Ref Range    Troponin T, High Sensitivity 10 <=15 ng/L   Comprehensive metabolic panel   Result Value Ref Range    Glucose 96 65 - 99 mg/dL    Sodium 133 133 - 145 mmol/L    Potassium 3.9 3.4 - 5.1 mmol/L    Chloride 100 97 - 107 mmol/L    Bicarbonate 21 (L) 24 - 31 mmol/L    Urea Nitrogen 28 (H) 8 - 25 mg/dL    Creatinine 0.80 0.40 - 1.60 mg/dL    eGFR 80 >60 mL/min/1.73m*2    Calcium 9.1 8.5 - 10.4 mg/dL    Albumin 3.9 3.5 - 5.0 g/dL    Alkaline Phosphatase 136 (H) 35 - 125 U/L    Total Protein 6.5 5.9 - 7.9 g/dL    AST 17 5 - 40 U/L    Bilirubin, Total 0.4 0.1 - 1.2 mg/dL    ALT 28 5 - 40 U/L    Anion Gap 12 <=19 mmol/L   CBC   Result Value Ref Range    WBC 9.7 4.4 - 11.3 x10*3/uL    nRBC 0.0 0.0 - 0.0 /100 WBCs    RBC 4.41 4.00 - 5.20 x10*6/uL    Hemoglobin 13.8 12.0 - 16.0 g/dL    Hematocrit 40.8 36.0 - 46.0 %    MCV 93 80 - 100 fL    MCH 31.3 26.0 - 34.0 pg    MCHC 33.8 32.0 - 36.0 g/dL    RDW 14.4 11.5 - 14.5 %    Platelets 246 150 - 450 x10*3/uL    MPV 10.9 7.5 - 11.5 fL     Prior to Admission medications    Medication Sig Start Date End Date Taking? Authorizing Provider   acetaminophen (Tylenol) 325 mg tablet Take 1 tablet (325 mg) by mouth every 6 hours if needed. 9/1/22  Yes Historical Provider, MD   albuterol 2.5 mg /3 mL (0.083 %) nebulizer solution Take 3 mL (2.5 mg) by nebulization 4 times a day. 9/28/23  Yes Historical Provider, MD   albuterol 90 mcg/actuation inhaler Inhale 2 puffs every 6 hours if needed for shortness of breath. 6/1/23  Yes Historical Provider, MD   amLODIPine (Norvasc) 10 mg tablet Take 1 tablet (10 mg) by mouth once daily. 6/1/23  Yes Historical  Provider, MD   aspirin 81 mg EC tablet Take 1 tablet (81 mg) by mouth once daily. 6/1/23  Yes Historical Provider, MD   atorvastatin (Lipitor) 40 mg tablet Take 1 tablet (40 mg) by mouth once daily at bedtime. 6/1/23  Yes Historical Provider, MD   bisoprolol (Zebeta) 5 mg tablet Take 1 tablet (5 mg) by mouth once daily. 9/20/23  Yes Historical Provider, MD   busPIRone (Buspar) 15 mg tablet Take 1 tablet (15 mg) by mouth 2 times a day. 7/25/11  Yes Historical Provider, MD   carBAMazepine (TEGretol) 200 mg tablet Take 1 tablet (200 mg) by mouth 2 times a day. 9/20/23  Yes Historical Provider, MD   DULoxetine (Cymbalta) 60 mg DR capsule Take 1 capsule (60 mg) by mouth once daily. 7/25/11  Yes Historical Provider, MD   estradiol (Estrace) 0.01 % (0.1 mg/gram) vaginal cream Insert 0.5 g into the vagina 2 times a week. 8/8/23  Yes Historical Provider, MD   fluticasone propion-salmeteroL (Advair Diskus) 250-50 mcg/dose diskus inhaler Inhale 1 puff 2 times a day. 9/1/22  Yes Historical Provider, MD   fluticasone propion-salmeteroL (Advair Diskus) 250-50 mcg/dose diskus inhaler Inhale 1 puff 2 times a day. 6/28/23  Yes Historical Provider, MD   gabapentin (Neurontin) 300 mg capsule Take 3 capsules (900 mg) by mouth 3 times a day. 3/14/23  Yes Historical Provider, MD   hydrALAZINE (Apresoline) 10 mg tablet Take 1 tablet (10 mg) by mouth 3 times a day. 6/1/23  Yes Historical Provider, MD   hydrOXYzine pamoate (Vistaril) 25 mg capsule Take 1 capsule (25 mg) by mouth 3 times a day as needed. 10/6/23  Yes Historical Provider, MD   hydrOXYzine pamoate (Vistaril) 50 mg capsule Take 1 capsule (50 mg) by mouth 3 times a day as needed for anxiety. 7/25/11  Yes Historical Provider, MD   lisinopril 20 mg tablet Take 1 tablet (20 mg) by mouth once daily. 7/25/11  Yes Historical Provider, MD   lovastatin (Mevacor) 20 mg tablet Take 1 tablet (20 mg) by mouth once daily at bedtime. 7/25/11  Yes Historical Provider, MD yamine  hippurate (Hiprex) 1 gram tablet Take 1 tablet (1 g) by mouth 2 times a day. 6/22/23  Yes Historical Provider, MD   nystatin (Mycostatin) cream Apply 100,000 g/m2 topically if needed (BID). 12/21/22  Yes Historical Provider, MD   traMADol (Ultram) 50 mg tablet Take 1 tablet (50 mg) by mouth every 6 hours if needed. 3/22/16  Yes Historical Provider, MD   traZODone (Desyrel) 100 mg tablet Take 1 tablet (100 mg) by mouth once daily at bedtime. 7/25/11  Yes Historical Provider, MD   venlafaxine 150 mg 24 hr tablet Take 1 tablet (150 mg) by mouth once daily with a meal. 9/1/22  Yes Historical Provider, MD   venlafaxine XR (Effexor-XR) 150 mg 24 hr capsule Take 2 capsules (300 mg) by mouth once daily. 10/6/23  Yes Historical Provider, MD   cholecalciferol (Vitamin D-3) 50 MCG (2000 UT) tablet Take 1 tablet (2,000 Units) by mouth once daily.    Historical Provider, MD   docusate sodium (Colace) 100 mg capsule Take 1 capsule (100 mg) by mouth 2 times a day.    Historical Provider, MD   HYDROcodone-acetaminophen (Norco) 5-325 mg tablet Take 1 tablet by mouth every 6 hours if needed for severe pain (7 - 10) for up to 10 doses. 10/17/23   Anika Roa MD   melatonin 3 mg tablet Take 1 tablet (3 mg) by mouth once daily at bedtime.    Historical Provider, MD   methylPREDNISolone (Medrol Dospak) 4 mg tablets Follow schedule on package instructions 10/17/23 10/24/23  Chon Gutierres PA-C   mometasone 200 mcg/actuation HFA aerosol inhaler Inhale 200 mcg 2 times a day.    Historical Provider, MD   omeprazole OTC (PriLOSEC OTC) 20 mg EC tablet Take 1 tablet (20 mg) by mouth 2 times a day before meals.    Historical Provider, MD   orphenadrine (Norflex) 100 mg 12 hr tablet Take 1 tablet (100 mg) by mouth 2 times a day as needed for muscle spasms for up to 14 doses. Do not crush, chew, or split. 10/17/23   Chon Gutierres PA-C   tiotropium-olodateroL (Stiolto Respimat) 2.5-2.5 mcg/actuation mist inhaler Inhale 2 Inhalations  once daily.    Historical Provider, MD       Current Facility-Administered Medications:     acetaminophen (Tylenol) tablet 650 mg, 650 mg, oral, q4h PRN **OR** acetaminophen (Tylenol) oral liquid 650 mg, 650 mg, oral, q4h PRN **OR** acetaminophen (Tylenol) suppository 650 mg, 650 mg, rectal, q4h PRN, Stan Pearce MD    acetaminophen (Tylenol) tablet 325 mg, 325 mg, oral, q6h PRN, Stan Pearce MD    acetaminophen (Tylenol) tablet 650 mg, 650 mg, oral, Once, Stan Pearce MD    albuterol 2.5 mg /3 mL (0.083 %) nebulizer solution 2.5 mg, 2.5 mg, nebulization, q2h PRN, Stan Pearce MD    albuterol 90 mcg/actuation inhaler 2 puff, 2 puff, inhalation, q6h PRN, Stan Pearce MD    amLODIPine (Norvasc) tablet 10 mg, 10 mg, oral, Daily, Stan Pearce MD, 10 mg at 10/24/23 0920    aspirin EC tablet 81 mg, 81 mg, oral, Daily, Stan Pearce MD, 81 mg at 10/24/23 0919    atorvastatin (Lipitor) tablet 40 mg, 40 mg, oral, Nightly, Stan Pearce MD, 40 mg at 10/24/23 0218    benzocaine-menthol (Cepastat Sore Throat) 15-3.6 mg lozenge 1 lozenge, 1 lozenge, Mouth/Throat, q2h PRN, Stan Pearce MD    bisoprolol (Zebeta) tablet 5 mg, 5 mg, oral, Daily, Stan Pearce MD, 5 mg at 10/24/23 0926    busPIRone (Buspar) tablet 15 mg, 15 mg, oral, BID, Stan Pearce MD, 15 mg at 10/24/23 0919    carBAMazepine (TEGretol) tablet 600 mg, 600 mg, oral, BID with meals, Stan Pearce MD, 600 mg at 10/24/23 0920    cefTRIAXone (Rocephin) IVPB 1 g, 1 g, intravenous, q24h, Stan Pearce MD    cholecalciferol (Vitamin D-3) tablet 2,000 Units, 2,000 Units, oral, Daily, Stan Pearce MD, 2,000 Units at 10/24/23 0920    dextromethorphan-guaifenesin (Robitussin DM)  mg/5 mL oral liquid 5 mL, 5 mL, oral, q4h PRN, Stan Pearce MD    docusate sodium (Colace) capsule 100 mg, 100 mg, oral, BID, Stan Pearce MD, 100 mg at 10/24/23 0225    estradiol (Estrace)  0.01 % (0.1 mg/gram) vaginal cream 0.5 g, 0.5 g, vaginal, Once per day on Tue Fri, Stan Pearce MD    fluticasone furoate-vilanteroL (Breo Ellipta) 200-25 mcg/dose inhaler 1 puff, 1 puff, inhalation, Daily, Stan Pearce MD, 1 puff at 10/24/23 0817    tiotropium (Spiriva Respimat) 2.5 mcg/actuation inhaler 2 puff, 2 Inhalation, inhalation, Daily, 2 puff at 10/24/23 0818 **AND** formoterol (Perforomist) 20 mcg/2 mL nebulizer solution 20 mcg, 20 mcg, nebulization, q12h, Stan Pearce MD, 20 mcg at 10/24/23 0502    gabapentin (Neurontin) capsule 900 mg, 900 mg, oral, TID, Stan Pearce MD, 900 mg at 10/24/23 1523    guaiFENesin (Mucinex) 12 hr tablet 600 mg, 600 mg, oral, q12h PRN, Stan Pearce MD    heparin (porcine) injection 5,000 Units, 5,000 Units, subcutaneous, q8h, Stan Pearce MD, 5,000 Units at 10/24/23 1518    hydrALAZINE (Apresoline) tablet 10 mg, 10 mg, oral, TID, Stan Pearce MD, 10 mg at 10/24/23 1518    HYDROcodone-acetaminophen (Norco) 5-325 mg per tablet 1 tablet, 1 tablet, oral, q6h PRN, Stan Pearce MD, 1 tablet at 10/24/23 1523    hydrOXYzine pamoate (Vistaril) capsule 25 mg, 25 mg, oral, Daily PRN, Stan Pearce MD    melatonin tablet 3 mg, 3 mg, oral, Nightly, Stan Pearce MD, 3 mg at 10/24/23 0218    methenamine hippurate (Hiprex) tablet 1 g, 1 g, oral, BID, Stan Pearce MD    nystatin (Mycostatin) cream, , Topical, PRN, Stan Pearce MD    orphenadrine (Norflex) 12 hr tablet 100 mg, 100 mg, oral, BID PRN, Stan Pearce MD    pantoprazole (ProtoNix) EC tablet 40 mg, 40 mg, oral, Daily before breakfast, Stan Pearce MD, 40 mg at 10/24/23 0625    polyethylene glycol (Glycolax, Miralax) packet 17 g, 17 g, oral, Daily PRN, Stan Pearce MD    traMADol (Ultram) tablet 50 mg, 50 mg, oral, q6h PRN, Stan Pearce MD    venlafaxine XR (Effexor-XR) 24 hr capsule 300 mg, 300 mg, oral, Daily, Stan HALL  MD Portia, 300 mg at 10/24/23 0919  XR chest 2 views    Result Date: 10/23/2023  Interpreted By:  Jamil Escalera, STUDY: XR CHEST 2 VIEWS;  10/23/2023 2:13 pm   INDICATION: Signs/Symptoms:weakness fall.   COMPARISON: 08/23/2023.   ACCESSION NUMBER(S): [id number]   ORDERING CLINICIAN: NICOLE ENRIQUEZ   FINDINGS:         CARDIOMEDIASTINAL SILHOUETTE: Cardiomediastinal silhouette is normal in size and configuration.   LUNGS: Lungs are clear.   ABDOMEN: No remarkable upper abdominal findings.   BONES: No acute osseous changes.       No evidence of acute cardiopulmonary process.     MACRO: None   Signed by: Jamil Escalera 10/23/2023 2:14 PM Dictation workstation:   [id number]    CT head wo IV contrast    Result Date: 10/23/2023  Interpreted By:  Jamil Escalera, STUDY: CT HEAD WO IV CONTRAST;  10/23/2023 12:53 pm   INDICATION: fall head injury.   COMPARISON: 08/24/2023.   ACCESSION NUMBER(S): [id number]   ORDERING CLINICIAN: NICOLE ENRIQUEZ   TECHNIQUE: Noncontrast axial CT scan of head was performed. Angled reformats in brain and bone windows were generated. The images were reviewed in bone, brain, blood and soft tissue windows.   FINDINGS: CSF Spaces: Ex vacuo dilation of the ventricles. Sulci and basal cisterns are within normal limits. There is no extraaxial fluid collection.   Parenchyma: Chronic bilateral white matter microvascular disease.. There is no mass effect or midline shift.  There is no intracranial hemorrhage.   Calvarium: The calvarium is unremarkable.   Paranasal sinuses and mastoids: Visualized paranasal sinuses and mastoids are clear.   Left frontoparietal scalp laceration.       1. No acute intracranial findings. 2. Left frontoparietal scalp laceration.   MACRO: None   Signed by: Jamil Escalera 10/23/2023 1:05 PM Dictation workstation:   [id number]    CT cervical spine wo IV contrast    Result Date: 10/23/2023  Interpreted By:  Jamil Escalera, STUDY: CT CERVICAL SPINE WO IV CONTRAST;  10/23/2023 12:53 pm   INDICATION:  Signs/Symptoms:fall.   COMPARISON: None.   ACCESSION NUMBER(S): [id number]   ORDERING CLINICIAN: NICOLE ENRIQUEZ   TECHNIQUE: Axial CT images of the cervical spine are obtained. Axial, coronal and sagittal reconstructions are provided for review.   FINDINGS: 7 cervical vertebrae. Cervical spine alignment is normal. Vertebral body heights are normal. No fractures. No suspicious osseous lesions. ACDF spanning C4 through C6, with interbody grafts at C4-C5 and C5-C6.   Visualized soft tissues of the neck do not demonstrate any suspicious findings. The lung apices clear.   C2-C3: Normal.   C3-C4: Disc osteophyte complex. No spinal canal or foraminal stenosis.   C4-C5: Surgical level. Mild bilateral foraminal stenosis. No spinal canal stenosis.   C5-C6: Surgical level. Mild bilateral foraminal stenosis. No spinal canal stenosis.   C6-7: Disc osteophyte complex. Severe right and mild left foraminal stenosis. No spinal canal stenosis.   C7-T1: Normal.         1. No acute cervical spine findings.   2. Degenerative changes of the cervical spine, including severe right foraminal stenosis at C6-C7 without spinal canal stenosis.   MACRO: None   Signed by: Jamil Escalera 10/23/2023 1:03 PM Dictation workstation:   [id number]    XR hip right 2 or 3 views    Result Date: 10/17/2023  Interpreted By:  Jamil Escalera, STUDY: XR HIP RIGHT 2 OR 3 VIEWS; ;  10/17/2023 4:08 pm   INDICATION: Signs/Symptoms:hip pain.   COMPARISON: None.   ACCESSION NUMBER(S): [id number]   ORDERING CLINICIAN: JLUIS LANDAVERDE   FINDINGS: No fractures. No subluxations. No radiopaque foreign bodies. No suspicious osseous lesions. Diffuse bony demineralization.       No acute findings.     MACRO: None   Signed by: Jamil Escalera 10/17/2023 4:17 PM Dictation workstation:   [id number]    XR lumbar spine 2-3 views    Result Date: 10/17/2023  Interpreted By:  Jamil Escalera, STUDY: XR LUMBAR SPINE 2-3 VIEWS; ;  10/17/2023 4:08 pm   INDICATION: Signs/Symptoms:back pain.   COMPARISON:  03/08/2016.   ACCESSION NUMBER(S): [id number]   ORDERING CLINICIAN: JLUIS LANDAVERDE   FINDINGS: Posterior spinal fusion spanning L3-S1. Interbody graft at L3-L4. Hardware appears intact. Retrolisthesis of L3 on L4. Degenerative endplate changes at L3-L4 through L5-S1. No fractures. Mild progression of adjacent disc disease at L2-L3, evidenced by new gas at the L2-L3 disc space. No suspicious bony lesions. Atherosclerotic disease of the abdominal aorta.       1. No acute lumbar spine findings. 2. Interval mild progression of adjacent disc disease at L2-L3.     MACRO: None   Signed by: Jamil Escalera 10/17/2023 4:15 PM Dictation workstation:   [id number]    No results found for the last 90 days.       Assessment/Plan   Principal Problem:    Closed head injury, initial encounter  Active Problems:    Closed head injury    Scalp laceration    Acute cystitis without hematuria    Generalized weakness    Fall    Functional assessment declined  Hypertension  Hyperlipidemia  Depression  Chronic low back pain  Sciatica pain  COPD  Osteoarthritis  Spinal stenosis  Functional decline    Plan: Continue current medication.  Get supportive care begin physical therapy and Occupational Therapy.  Monitor CBC BMP.  Continue antibiotics.  Consult neurology.  Monitor blood pressure.  Monitor pulse ox.  Control pain.  Continue antidepressant.  Urine culture and change antibiotics if needed.  We will treat DVT, fall, aspiration decubitus precaution.  This has been discussed with the patient and she is agreeable to it.      Stan Pearce MD

## 2023-10-24 NOTE — CARE PLAN
Problem: PT Problem  Goal: Patient will ambulate 50 distance using walker with supervision  Outcome: Progressing  Note: Patient will ambulate 50 distance using walker with supervision  Goal: Patient will perform chair to and from bed transfer with supervision  Outcome: Progressing  Note: Patient will perform chair to and from bed transfer with supervision  Goal: Patient will perform supine to sit on bed with independent demonstrating control  Outcome: Progressing  Note: Patient will perform supine to sit on bed with independent demonstrating control  Goal: Patient will perform sit to supine on bed with independent demonstrating control  Outcome: Progressing  Note: Patient will perform sit to supine on bed with independent demonstrating control

## 2023-10-24 NOTE — PROGRESS NOTES
Physical Therapy    Physical Therapy Evaluation & Treatment    Patient Name: Babar Quigley  MRN: [id number]  Today's Date: 10/24/2023   Time Calculation  Start Time: 0830  Stop Time: 0900  Time Calculation (min): 30 min    Assessment/Plan   PT Assessment  PT Assessment Results: Decreased strength, Decreased endurance, Impaired balance, Decreased mobility, Decreased coordination, Decreased safety awareness  Rehab Prognosis: Good  Evaluation/Treatment Tolerance: Patient limited by fatigue  End of Session Communication: Bedside nurse  End of Session Patient Position: Bed, 2 rail up, Alarm on  IP OR SWING BED PT PLAN  Inpatient or Swing Bed: Inpatient  PT Plan  Treatment/Interventions: Bed mobility, Transfer training, Gait training, Balance training, Strengthening, Endurance training, Therapeutic exercise, Therapeutic activity, Home exercise program  PT Plan: Skilled PT  PT Frequency: 4 times per week  PT Discharge Recommendations: Moderate intensity level of continued care  PT Recommended Transfer Status: Assist x1  PT - OK to Discharge: Yes (With continued skilled physical therapy services at next level of care.)      Subjective     General Visit Information:  General  Reason for Referral: Closed head injury; impaired mobility  Past Medical History Relevant to Rehab: COPD, HTN, arthritis, nueropathy, recurrent falls, dyslipidemia, anxiety, UTI, spine surgery  Prior to Session Communication: Bedside nurse  Patient Position Received: Bed, 2 rail up, Alarm on  General Comment: 68 year old female admit from home due to weakness and fall.  Home Living:  Home Living  Type of Home: Apartment  Lives With: Alone  Home Adaptive Equipment: Walker rolling or standard, Wheelchair-manual, Cane  Home Layout: One level  Home Access: Level entry  Bathroom Shower/Tub: Tub/shower unit, Walk-in shower  Bathroom Equipment: Grab bars in shower, Shower chair with back  Home Living Comments: Home health care (RN, PT, HHA)  Prior Level of  Function:  Prior Function Per Pt/Caregiver Report  Ambulatory Assistance: Independent (Mod independent with rolling walker household distances.)  Prior Function Comments: Patient has been using wheelchair in apartment x 3 days due to generalized weakness.  Precautions:  Precautions  Medical Precautions: Fall precautions  Precautions Comment: Heart monitor  Vital Signs:  Vital Signs  Heart Rate: 75  Heart Rate Source: Monitor    Objective   Pain:  Pain Assessment  Pain Assessment: FLACC (Face, Legs, Activity, Cry, Consolability)  Pain Score: 4  Pain Location: Hip  Pain Orientation: Right  Cognition:  Cognition  Overall Cognitive Status: Within Functional Limits    General Assessments:  General Observation  General Observation: Laceration left side forehead             Activity Tolerance  Endurance: Decreased tolerance for upright activites  Activity Tolerance Comments: Patient appears to fatigue quickly during mobility    Sensation  Light Touch: No apparent deficits (Denies numbness tamika LE but patient has history of neuropathy per medical records.)    Coordination  Movements are Fluid and Coordinated: No  Lower Body Coordination: Slower rate of movement tamika LE (right greater than left)    Static Sitting Balance  Static Sitting-Balance Support: Bilateral upper extremity supported  Static Sitting-Level of Assistance: Close supervision    Static Standing Balance  Static Standing-Balance Support: Bilateral upper extremity supported  Static Standing-Level of Assistance: Minimum assistance  Static Standing-Comment/Number of Minutes: Walker for support during standing  Functional Assessments:  Bed Mobility  Bed Mobility: Yes  Bed Mobility 1  Bed Mobility 1: Supine to sitting  Level of Assistance 1: Close supervision  Bed Mobility Comments 1: Increased time and effort required to achieve supine to sit;  patient used bedrail to assist with supine to sit.  Bed Mobility 2  Bed Mobility  2: Sitting to supine  Level of Assistance  2: Close supervision  Bed Mobility Comments 2: Impulsive with movements during sit to supine.    Transfers  Transfer: Yes  Transfer 1  Transfer From 1: Sit to  Transfer to 1: Stand  Technique 1: Sit to stand  Transfer Device 1: Walker  Transfer Level of Assistance 1: Minimum assistance  Trials/Comments 1: 2 reps;  verbal cues for hand placement.  Transfers 2  Transfer From 2: Stand to  Transfer to 2: Sit  Technique 2: Stand to sit  Transfer Device 2: Walker  Transfer Level of Assistance 2: Minimum assistance  Trials/Comments 2: 2 reps;  verbal cues for hand placement.    Ambulation/Gait Training  Ambulation/Gait Training Performed: Yes  Ambulation/Gait Training 1  Surface 1: Level tile  Device 1: Rolling walker  Assistance 1: Minimum assistance  Comments/Distance (ft) 1: 10 feet x 1, 15 feet x 1 with rolling walker and assist for trunk support and balance;  verbal cues for posture. (Patient ambulates with slow maeve, reciprocating gait pattern, decreased step length tamika LE, and forward flexed posture.)    Stairs  Stairs: No  Extremity/Trunk Assessments:  RLE   RLE : Exceptions to WFL  Strength RLE  R Hip Flexion: 2+/5  R Knee Extension: 3/5  R Ankle Dorsiflexion: 3+/5  LLE   LLE : Exceptions to WFL  Strength LLE  L Hip Flexion: 3/5  L Knee Extension: 3+/5  L Ankle Dorsiflexion: 3+/5  Treatments:            Bed Mobility  Bed Mobility: Yes  Bed Mobility 1  Bed Mobility 1: Supine to sitting  Level of Assistance 1: Close supervision  Bed Mobility Comments 1: Increased time and effort required to achieve supine to sit;  patient used bedrail to assist with supine to sit.  Bed Mobility 2  Bed Mobility  2: Sitting to supine  Level of Assistance 2: Close supervision  Bed Mobility Comments 2: Impulsive with movements during sit to supine.    Ambulation/Gait Training  Ambulation/Gait Training Performed: Yes  Ambulation/Gait Training 1  Surface 1: Level tile  Device 1: Rolling walker  Assistance 1: Minimum  assistance  Comments/Distance (ft) 1: 10 feet x 1, 15 feet x 1 with rolling walker and assist for trunk support and balance;  verbal cues for posture. (Patient ambulates with slow maeve, reciprocating gait pattern, decreased step length tamika LE, and forward flexed posture.)  Transfers  Transfer: Yes  Transfer 1  Transfer From 1: Sit to  Transfer to 1: Stand  Technique 1: Sit to stand  Transfer Device 1: Walker  Transfer Level of Assistance 1: Minimum assistance  Trials/Comments 1: 2 reps;  verbal cues for hand placement.  Transfers 2  Transfer From 2: Stand to  Transfer to 2: Sit  Technique 2: Stand to sit  Transfer Device 2: Walker  Transfer Level of Assistance 2: Minimum assistance  Trials/Comments 2: 2 reps;  verbal cues for hand placement.    Stairs  Stairs: No       Outcome Measures:  [facility] Basic Mobility  Turning from your back to your side while in a flat bed without using bedrails: A little  Moving from lying on your back to sitting on the side of a flat bed without using bedrails: A little  Moving to and from bed to chair (including a wheelchair): A little  Standing up from a chair using your arms (e.g. wheelchair or bedside chair): A little  To walk in hospital room: A little  Climbing 3-5 steps with railing: A lot  Basic Mobility - Total Score: 17    Encounter Problems       Encounter Problems (Active)       PT Problem       Patient will ambulate 50 distance using walker with supervision (Progressing)       Start:  10/24/23    Expected End:  10/24/23         Goal Note       Patient will ambulate 50 distance using walker with supervision              Patient will perform chair to and from bed transfer with supervision (Progressing)       Start:  10/24/23    Expected End:  10/24/23         Goal Note       Patient will perform chair to and from bed transfer with supervision              Patient will perform supine to sit on bed with independent demonstrating control (Progressing)       Start:  10/24/23     Expected End:  10/24/23         Goal Note       Patient will perform supine to sit on bed with independent demonstrating control              Patient will perform sit to supine on bed with independent demonstrating control (Progressing)       Start:  10/24/23    Expected End:  10/24/23         Goal Note       Patient will perform sit to supine on bed with independent demonstrating control                     Education Documentation  Mobility Training, taught by Slava Pickard, PT at 10/24/2023  9:36 AM.  Learner: Patient  Readiness: Acceptance  Method: Explanation, Demonstration  Response: Needs Reinforcement    Education Comments  No comments found.

## 2023-10-24 NOTE — PROGRESS NOTES
Babar Quigley is a 68 y.o. female on day 1 of admission presenting with Closed head injury, initial encounter.    TCC met with patient at bedside.  An explanation of discharge planning was provided. Patient resides alone in an apartment.  There are no steps to enter the apartment.  Patient transfers with a walker. She also uses a wheelchair. Patient is a  and [facility] Patient has healthcare aides for six hours a day 5 days per week.  The aides help with cleaning, cooking, shopping, toileting and bathing. Once the aides leave at 1500, the patient states she will just stay in bed to avoid falling.  Patient admits to having several falls including a fall which damaged her top dentures. Patient's niece, Kanchan De La Torre, is [facility].  A copy of paperwork was requested for hospital records.  [facility] and [facility].  Patient was recently discharged from [facility] in September.  She does not wish to return to [facility]. Patient is declining SNF because she has two small dogs in the home to care for. PT is recommendaing moderate intesity rehab.     TCC phoned [facility] La [facility].  Kanchan has already [facility] with [facility].  Kanchan states the patient is not safe in the home.  She has had several falls.  Kanchan has chosen [facility] in [place] or [facility] in [place].  [facility] at the [facility] has already [facility]. [facility] completed and returned to [email or web address].                  Jaz Solomon RN

## 2023-10-24 NOTE — NURSING NOTE
Assumed care of patient. Bedside shift report completed. Patient resting in bed, low position. Call light within reach. Denies needs at this time.   no

## 2023-10-24 NOTE — CARE PLAN
The patient's goals for the shift include      The clinical goals for the shift include patient doesn't sustain a fall

## 2023-10-24 NOTE — CONSULTS
Nutrition Assessment Note    Reason for Hospital Admission:  Babar Quigley is a 68 y.o. female who is admitted for a closed head injury. Pt has had declining weakness and reports feeling shaky. Pt has a small laceration to left forehead. Pt declines supplements.    Nutrition Assessment:  Reason for Assessment  Reason for Assessment: Dietitian discretion (MST score of 2)     has a past medical history of Arthritis, COPD (chronic obstructive pulmonary disease) (CMS/[facility]ly [facility]), and Hypertension.    She has no past medical history of Asthma, Cancer (CMS/[facility]), CHF (congestive heart failure) (CMS/Formerly [facility]), Coronary artery disease, Diabetes mellitus (CMS/[facility]ly [facility]), Disease of thyroid gland, History of transfusion, or Stroke (CMS/[facility]ly [facility]).     Allergies   Allergen Reactions    Pregabalin Hallucinations    Sulfamethoxazole-Trimethoprim Rash        Lab Results   Component Value Date    WBC 9.7 10/24/2023    HGB 13.8 10/24/2023    HCT 40.8 10/24/2023     10/24/2023    CHOL 126 (L) 07/08/2023    TRIG 103 07/08/2023    HDL 52 07/08/2023    LDLDIRECT 73 12/18/2020    ALT 28 10/24/2023    AST 17 10/24/2023     10/24/2023    K 3.9 10/24/2023     10/24/2023    CREATININE 0.80 10/24/2023    BUN 28 (H) 10/24/2023    CO2 21 (L) 10/24/2023    TSH 1.11 08/23/2023    INR 1.0 08/23/2023    HGBA1C 5.5 04/28/2023       Current Facility-Administered Medications:     acetaminophen (Tylenol) tablet 650 mg, 650 mg, oral, q4h PRN **OR** acetaminophen (Tylenol) oral liquid 650 mg, 650 mg, oral, q4h PRN **OR** acetaminophen (Tylenol) suppository 650 mg, 650 mg, rectal, q4h PRN, Stan Pearce MD    acetaminophen (Tylenol) tablet 325 mg, 325 mg, oral, q6h PRN, Stan Pearce MD    acetaminophen (Tylenol) tablet 650 mg, 650 mg, oral, Once, Stan Pearce MD    albuterol 2.5 mg /3 mL (0.083 %) nebulizer solution 2.5 mg, 2.5 mg, nebulization, q2h PRN, Stan Pearce MD    albuterol 90 mcg/actuation inhaler 2 puff, 2 puff,  inhalation, q6h PRN, Stan Pearce MD    amLODIPine (Norvasc) tablet 10 mg, 10 mg, oral, Daily, Stan Pearce MD, 10 mg at 10/24/23 0920    aspirin EC tablet 81 mg, 81 mg, oral, Daily, Stan Pearce MD, 81 mg at 10/24/23 0919    atorvastatin (Lipitor) tablet 40 mg, 40 mg, oral, Nightly, Stan Pearce MD, 40 mg at 10/24/23 0218    benzocaine-menthol (Cepastat Sore Throat) 15-3.6 mg lozenge 1 lozenge, 1 lozenge, Mouth/Throat, q2h PRN, Stan Pearce MD    bisoprolol (Zebeta) tablet 5 mg, 5 mg, oral, Daily, Stan Pearce MD, 5 mg at 10/24/23 0926    busPIRone (Buspar) tablet 15 mg, 15 mg, oral, BID, Stan Pearce MD, 15 mg at 10/24/23 0919    carBAMazepine (TEGretol) tablet 600 mg, 600 mg, oral, BID with meals, Stan Pearce MD, 600 mg at 10/24/23 0920    cefTRIAXone (Rocephin) IVPB 1 g, 1 g, intravenous, q24h, Stan Pearce MD    cholecalciferol (Vitamin D-3) tablet 2,000 Units, 2,000 Units, oral, Daily, Stan Pearce MD, 2,000 Units at 10/24/23 0920    dextromethorphan-guaifenesin (Robitussin DM)  mg/5 mL oral liquid 5 mL, 5 mL, oral, q4h PRN, Stan Paerce MD    docusate sodium (Colace) capsule 100 mg, 100 mg, oral, BID, Stan Pearce MD, 100 mg at 10/24/23 0225    estradiol (Estrace) 0.01 % (0.1 mg/gram) vaginal cream 0.5 g, 0.5 g, vaginal, Once per day on Tue Fri, Stan Pearce MD    fluticasone furoate-vilanteroL (Breo Ellipta) 200-25 mcg/dose inhaler 1 puff, 1 puff, inhalation, Daily, Stan Pearce MD, 1 puff at 10/24/23 0817    tiotropium (Spiriva Respimat) 2.5 mcg/actuation inhaler 2 puff, 2 Inhalation, inhalation, Daily, 2 puff at 10/24/23 0818 **AND** formoterol (Perforomist) 20 mcg/2 mL nebulizer solution 20 mcg, 20 mcg, nebulization, q12h, Stan Pearce MD, 20 mcg at 10/24/23 0502    gabapentin (Neurontin) capsule 900 mg, 900 mg, oral, TID, Stan Pearce MD, 900 mg at 10/24/23 0919    guaiFENesin (Mucinex)  "12 hr tablet 600 mg, 600 mg, oral, q12h PRN, Stan Pearce MD    heparin (porcine) injection 5,000 Units, 5,000 Units, subcutaneous, q8h, Stan Pearce MD, 5,000 Units at 10/24/23 0625    hydrALAZINE (Apresoline) tablet 10 mg, 10 mg, oral, TID, tSan Pearce MD, 10 mg at 10/24/23 0919    HYDROcodone-acetaminophen (Norco) 5-325 mg per tablet 1 tablet, 1 tablet, oral, q6h PRN, Stan Pearce MD    hydrOXYzine pamoate (Vistaril) capsule 25 mg, 25 mg, oral, Daily PRN, Stan Pearce MD    melatonin tablet 3 mg, 3 mg, oral, Nightly, Stan Pearce MD, 3 mg at 10/24/23 0218    methenamine hippurate (Hiprex) tablet 1 g, 1 g, oral, BID, Stan Pearce MD    nystatin (Mycostatin) cream, , Topical, PRN, Stan Pearce MD    orphenadrine (Norflex) 12 hr tablet 100 mg, 100 mg, oral, BID PRN, Stan Pearce MD    pantoprazole (ProtoNix) EC tablet 40 mg, 40 mg, oral, Daily before breakfast, Stan Pearce MD, 40 mg at 10/24/23 0625    polyethylene glycol (Glycolax, Miralax) packet 17 g, 17 g, oral, Daily PRN, Stan Pearce MD    traMADol (Ultram) tablet 50 mg, 50 mg, oral, q6h PRN, Stan Pearce MD    venlafaxine XR (Effexor-XR) 24 hr capsule 300 mg, 300 mg, oral, Daily, Stan Pearce MD, 300 mg at 10/24/23 0919  Nutrition Pertinent meds: lipitor, Vit D, colace, protonix, miralax    Dietary Orders (From admission, onward)       Start     Ordered    10/23/23 2316  May Participate in Room Service  Once        Question:  .  Answer:  Yes    10/23/23 2317    10/23/23 2305  Adult diet Regular  Diet effective now        Question:  Diet type  Answer:  Regular    10/23/23 4526                  History:  Food and Nutrient History  Energy Intake: Fair 50-75 %  Food and Nutrient History: Pt reports she typically only eats 1 meal/day and it is usually dinner. This is her usual intake.    Anthropometrics:  Ht: 167.6 cm (5' 6\"), Wt: 98 kg (216 lb), BMI: 34.88    Weight " Change  Weight History / % Weight Change: Pt has a conflicting wt history. Pt reports a usual wt of 203 lb. and a current wt of 180 to 190 lb. Records show a current wt of 216 lb. Pt reports recent wt loss d/t decreased mobility recently which has caused a decrease in snacking      IBW/kg (Dietitian Calculated): 59.1 kg     Estimated Energy Needs  Total Energy Estimated Needs (kCal): 1773 kCal  Total Estimated Energy Need per Day (kCal/kg): 30 kCal/kg  Method for Estimating Needs: ideal wt    Estimated Protein Needs  Total Protein Estimated Needs (g): 71 g  Total Protein Estimated Needs (g/kg): 1.2 g/kg  Method for Estimating Needs: ideal wt    Estimated Fluid Needs  Total Fluid Estimated Needs (mL): 1773 mL  Method for Estimating Needs: 1 ml/kcal    Nutrition Focused Physical Findings:     Nutrition Diagnosis:  Malnutrition Diagnosis  Patient has Malnutrition Diagnosis: No    Patient has Nutrition Diagnosis: Yes  Unintended weight loss related to decreased ability to consume sufficient energy AEB weight loss     Nutrition Interventions/Recommendations:  Food and/or Nutrient Delivery Interventions     Education Documentation  No documentation found.    Nutrition Monitoring/Evaluation:  Food and Nutrient Related History     RD Recommendations: None at this time.    Follow Up  Time Spent (min): 30 minutes  Last Date of Nutrition Visit: 10/24/23  Nutrition Follow-Up Needed?: 7-10 days  Follow up Comment: 10/31/23

## 2023-10-25 LAB
ANION GAP SERPL CALC-SCNC: 10 MMOL/L
BUN SERPL-MCNC: 35 MG/DL (ref 8–25)
CALCIUM SERPL-MCNC: 9 MG/DL (ref 8.5–10.4)
CHLORIDE SERPL-SCNC: 104 MMOL/L (ref 97–107)
CO2 SERPL-SCNC: 21 MMOL/L (ref 24–31)
CREAT SERPL-MCNC: 1 MG/DL (ref 0.4–1.6)
ERYTHROCYTE [DISTWIDTH] IN BLOOD BY AUTOMATED COUNT: 14.2 % (ref 11.5–14.5)
GFR SERPL CREATININE-BSD FRML MDRD: 61 ML/MIN/1.73M*2
GLUCOSE SERPL-MCNC: 91 MG/DL (ref 65–99)
HCT VFR BLD AUTO: 38.4 % (ref 36–46)
HGB BLD-MCNC: 12.8 G/DL (ref 12–16)
MCH RBC QN AUTO: 31.3 PG (ref 26–34)
MCHC RBC AUTO-ENTMCNC: 33.3 G/DL (ref 32–36)
MCV RBC AUTO: 94 FL (ref 80–100)
NRBC BLD-RTO: 0 /100 WBCS (ref 0–0)
PLATELET # BLD AUTO: 212 X10*3/UL (ref 150–450)
PMV BLD AUTO: 10.6 FL (ref 7.5–11.5)
POTASSIUM SERPL-SCNC: 4.4 MMOL/L (ref 3.4–5.1)
RBC # BLD AUTO: 4.09 X10*6/UL (ref 4–5.2)
SODIUM SERPL-SCNC: 135 MMOL/L (ref 133–145)
WBC # BLD AUTO: 7.8 X10*3/UL (ref 4.4–11.3)

## 2023-10-25 PROCEDURE — 1200000002 HC GENERAL ROOM WITH TELEMETRY DAILY

## 2023-10-25 PROCEDURE — 85027 COMPLETE CBC AUTOMATED: CPT | Performed by: NURSE PRACTITIONER

## 2023-10-25 PROCEDURE — 36415 COLL VENOUS BLD VENIPUNCTURE: CPT | Performed by: NURSE PRACTITIONER

## 2023-10-25 PROCEDURE — 97530 THERAPEUTIC ACTIVITIES: CPT | Mod: GO

## 2023-10-25 PROCEDURE — 94640 AIRWAY INHALATION TREATMENT: CPT

## 2023-10-25 PROCEDURE — 96372 THER/PROPH/DIAG INJ SC/IM: CPT | Performed by: INTERNAL MEDICINE

## 2023-10-25 PROCEDURE — 2500000005 HC RX 250 GENERAL PHARMACY W/O HCPCS: Performed by: NURSE PRACTITIONER

## 2023-10-25 PROCEDURE — 2500000002 HC RX 250 W HCPCS SELF ADMINISTERED DRUGS (ALT 637 FOR MEDICARE OP, ALT 636 FOR OP/ED): Performed by: INTERNAL MEDICINE

## 2023-10-25 PROCEDURE — 82374 ASSAY BLOOD CARBON DIOXIDE: CPT | Performed by: NURSE PRACTITIONER

## 2023-10-25 PROCEDURE — 97116 GAIT TRAINING THERAPY: CPT | Mod: GP,CQ

## 2023-10-25 PROCEDURE — 97530 THERAPEUTIC ACTIVITIES: CPT | Mod: GP,CQ

## 2023-10-25 PROCEDURE — 9420000001 HC RT PATIENT EDUCATION 5 MIN

## 2023-10-25 PROCEDURE — 97110 THERAPEUTIC EXERCISES: CPT | Mod: GO

## 2023-10-25 PROCEDURE — 2500000001 HC RX 250 WO HCPCS SELF ADMINISTERED DRUGS (ALT 637 FOR MEDICARE OP): Performed by: INTERNAL MEDICINE

## 2023-10-25 PROCEDURE — 2500000004 HC RX 250 GENERAL PHARMACY W/ HCPCS (ALT 636 FOR OP/ED): Performed by: INTERNAL MEDICINE

## 2023-10-25 RX ADMIN — HYDROCODONE BITARTRATE AND ACETAMINOPHEN 1 TABLET: 5; 325 TABLET ORAL at 03:40

## 2023-10-25 RX ADMIN — HEPARIN SODIUM 5000 UNITS: 5000 INJECTION, SOLUTION INTRAVENOUS; SUBCUTANEOUS at 06:36

## 2023-10-25 RX ADMIN — CARBAMAZEPINE 600 MG: 200 TABLET ORAL at 17:01

## 2023-10-25 RX ADMIN — DOCUSATE SODIUM 100 MG: 100 CAPSULE, LIQUID FILLED ORAL at 21:48

## 2023-10-25 RX ADMIN — PANTOPRAZOLE SODIUM 40 MG: 40 TABLET, DELAYED RELEASE ORAL at 06:36

## 2023-10-25 RX ADMIN — DOCUSATE SODIUM 100 MG: 100 CAPSULE, LIQUID FILLED ORAL at 08:11

## 2023-10-25 RX ADMIN — GABAPENTIN 900 MG: 300 CAPSULE ORAL at 14:08

## 2023-10-25 RX ADMIN — GABAPENTIN 900 MG: 300 CAPSULE ORAL at 21:48

## 2023-10-25 RX ADMIN — BUSPIRONE HYDROCHLORIDE 15 MG: 5 TABLET ORAL at 08:10

## 2023-10-25 RX ADMIN — BUSPIRONE HYDROCHLORIDE 15 MG: 5 TABLET ORAL at 21:47

## 2023-10-25 RX ADMIN — HYDRALAZINE HYDROCHLORIDE 10 MG: 10 TABLET, FILM COATED ORAL at 08:11

## 2023-10-25 RX ADMIN — LIDOCAINE 1 PATCH: 560 PATCH PERCUTANEOUS; TOPICAL; TRANSDERMAL at 08:11

## 2023-10-25 RX ADMIN — Medication 2000 UNITS: at 08:11

## 2023-10-25 RX ADMIN — FORMOTEROL FUMARATE DIHYDRATE 20 MCG: 20 SOLUTION RESPIRATORY (INHALATION) at 07:16

## 2023-10-25 RX ADMIN — FLUTICASONE FUROATE AND VILANTEROL TRIFENATATE 1 PUFF: 200; 25 POWDER RESPIRATORY (INHALATION) at 07:16

## 2023-10-25 RX ADMIN — CEFTRIAXONE SODIUM 1 G: 1 INJECTION, SOLUTION INTRAVENOUS at 17:01

## 2023-10-25 RX ADMIN — HYDRALAZINE HYDROCHLORIDE 10 MG: 10 TABLET, FILM COATED ORAL at 21:48

## 2023-10-25 RX ADMIN — HYDROCODONE BITARTRATE AND ACETAMINOPHEN 1 TABLET: 5; 325 TABLET ORAL at 14:14

## 2023-10-25 RX ADMIN — HYDRALAZINE HYDROCHLORIDE 10 MG: 10 TABLET, FILM COATED ORAL at 14:08

## 2023-10-25 RX ADMIN — VENLAFAXINE HYDROCHLORIDE 300 MG: 150 CAPSULE, EXTENDED RELEASE ORAL at 08:10

## 2023-10-25 RX ADMIN — FORMOTEROL FUMARATE DIHYDRATE 20 MCG: 20 SOLUTION RESPIRATORY (INHALATION) at 19:54

## 2023-10-25 RX ADMIN — TIOTROPIUM BROMIDE INHALATION SPRAY 2 PUFF: 3.12 SPRAY, METERED RESPIRATORY (INHALATION) at 07:16

## 2023-10-25 RX ADMIN — HEPARIN SODIUM 5000 UNITS: 5000 INJECTION, SOLUTION INTRAVENOUS; SUBCUTANEOUS at 14:35

## 2023-10-25 RX ADMIN — CARBAMAZEPINE 600 MG: 200 TABLET ORAL at 08:10

## 2023-10-25 RX ADMIN — HYDROCODONE BITARTRATE AND ACETAMINOPHEN 1 TABLET: 5; 325 TABLET ORAL at 10:11

## 2023-10-25 RX ADMIN — ASPIRIN 81 MG: 81 TABLET, COATED ORAL at 08:11

## 2023-10-25 RX ADMIN — Medication 3 MG: at 21:47

## 2023-10-25 RX ADMIN — GABAPENTIN 900 MG: 300 CAPSULE ORAL at 08:10

## 2023-10-25 RX ADMIN — ATORVASTATIN CALCIUM 40 MG: 40 TABLET, FILM COATED ORAL at 21:48

## 2023-10-25 RX ADMIN — BISOPROLOL FUMARATE 5 MG: 5 TABLET, FILM COATED ORAL at 08:11

## 2023-10-25 RX ADMIN — AMLODIPINE BESYLATE 10 MG: 10 TABLET ORAL at 08:11

## 2023-10-25 RX ADMIN — HEPARIN SODIUM 5000 UNITS: 5000 INJECTION, SOLUTION INTRAVENOUS; SUBCUTANEOUS at 22:34

## 2023-10-25 ASSESSMENT — COGNITIVE AND FUNCTIONAL STATUS - GENERAL
MOBILITY SCORE: 19
CLIMB 3 TO 5 STEPS WITH RAILING: A LOT
DRESSING REGULAR LOWER BODY CLOTHING: A LITTLE
DAILY ACTIVITIY SCORE: 19
CLIMB 3 TO 5 STEPS WITH RAILING: A LOT
STANDING UP FROM CHAIR USING ARMS: A LITTLE
DRESSING REGULAR UPPER BODY CLOTHING: A LITTLE
DRESSING REGULAR LOWER BODY CLOTHING: A LITTLE
MOVING TO AND FROM BED TO CHAIR: A LITTLE
HELP NEEDED FOR BATHING: A LITTLE
MOVING TO AND FROM BED TO CHAIR: A LITTLE
WALKING IN HOSPITAL ROOM: A LITTLE
MOBILITY SCORE: 20
TOILETING: A LITTLE
DRESSING REGULAR LOWER BODY CLOTHING: A LITTLE
TURNING FROM BACK TO SIDE WHILE IN FLAT BAD: A LITTLE
DRESSING REGULAR UPPER BODY CLOTHING: A LITTLE
PERSONAL GROOMING: A LITTLE
DRESSING REGULAR UPPER BODY CLOTHING: A LITTLE
WALKING IN HOSPITAL ROOM: A LITTLE
TOILETING: A LITTLE
MOVING TO AND FROM BED TO CHAIR: A LITTLE
HELP NEEDED FOR BATHING: A LITTLE
MOBILITY SCORE: 18
WALKING IN HOSPITAL ROOM: A LITTLE
STANDING UP FROM CHAIR USING ARMS: A LITTLE
STANDING UP FROM CHAIR USING ARMS: A LITTLE
TOILETING: A LITTLE
DAILY ACTIVITIY SCORE: 19
PERSONAL GROOMING: A LITTLE
HELP NEEDED FOR BATHING: A LITTLE
CLIMB 3 TO 5 STEPS WITH RAILING: A LITTLE
DAILY ACTIVITIY SCORE: 19
PERSONAL GROOMING: A LITTLE

## 2023-10-25 ASSESSMENT — PAIN SCALES - GENERAL
PAINLEVEL_OUTOF10: 5 - MODERATE PAIN
PAINLEVEL_OUTOF10: 0 - NO PAIN
PAINLEVEL_OUTOF10: 8
PAINLEVEL_OUTOF10: 0 - NO PAIN
PAINLEVEL_OUTOF10: 0 - NO PAIN
PAINLEVEL_OUTOF10: 8
PAINLEVEL_OUTOF10: 0 - NO PAIN
PAINLEVEL_OUTOF10: 0 - NO PAIN
PAINLEVEL_OUTOF10: 4
PAINLEVEL_OUTOF10: 2
PAINLEVEL_OUTOF10: 0 - NO PAIN

## 2023-10-25 ASSESSMENT — PAIN - FUNCTIONAL ASSESSMENT
PAIN_FUNCTIONAL_ASSESSMENT: 0-10

## 2023-10-25 ASSESSMENT — PAIN DESCRIPTION - DESCRIPTORS: DESCRIPTORS: ACHING;BURNING;DISCOMFORT

## 2023-10-25 ASSESSMENT — ENCOUNTER SYMPTOMS
ACTIVITY CHANGE: 1
WEAKNESS: 1

## 2023-10-25 NOTE — CONSULTS
Inpatient consult to Neurology  Consult performed by: MILO Winslow-CNP  Consult ordered by: Stan Pearce MD          History Of Present Illness  Babar Quigley is a 68 y.o. female presenting with fall with closed head injury.  She is alert and oriented today tells me that at baseline she uses a wheelchair she went to get up when her legs gave way and she fell to the left.  Normally when she falls she falls to the right.  She does complain of transient leg weakness and notes that she has had this for many years.  Daily tobacco use denies alcohol or drug use.      Past Medical History  Past Medical History:   Diagnosis Date    Arthritis     COPD (chronic obstructive pulmonary disease) (CMS/[facility])     Hypertension      Surgical History  Past Surgical History:   Procedure Laterality Date    MR HEAD ANGIO WO IV CONTRAST  02/22/2022    MR HEAD ANGIO WO IV CONTRAST [facility]    MR HEAD ANGIO WO IV CONTRAST  02/22/2022    MR HEAD ANGIO WO IV CONTRAST [facility]    MR HEAD ANGIO WO IV CONTRAST  04/28/2023    MR HEAD ANGIO WO IV CONTRAST [facility]    MR NECK ANGIO WO IV CONTRAST  02/22/2022    MR NECK ANGIO WO IV CONTRAST [facility]    MR NECK ANGIO WO IV CONTRAST  04/28/2023    MR NECK ANGIO WO IV CONTRAST [facility]    TONSILLECTOMY       Social History  Social History     Tobacco Use    Smoking status: Every Day     Packs/day: 2.00     Years: 50.00     Additional pack years: 0.00     Total pack years: 100.00     Types: Cigarettes    Smokeless tobacco: Former   Substance Use Topics    Alcohol use: Not Currently    Drug use: Never     Allergies  Pregabalin and Sulfamethoxazole-trimethoprim  Medications Prior to Admission   Medication Sig Dispense Refill Last Dose    acetaminophen (Tylenol) 325 mg tablet Take 1 tablet (325 mg) by mouth every 6 hours if needed.       albuterol 2.5 mg /3 mL (0.083 %) nebulizer solution Take 3 mL (2.5 mg) by nebulization 4 times a  day.       albuterol 90 mcg/actuation inhaler Inhale 2 puffs every 6 hours if needed for shortness of breath.       amLODIPine (Norvasc) 10 mg tablet Take 1 tablet (10 mg) by mouth once daily.       aspirin 81 mg EC tablet Take 1 tablet (81 mg) by mouth once daily.       atorvastatin (Lipitor) 40 mg tablet Take 1 tablet (40 mg) by mouth once daily at bedtime.       bisoprolol (Zebeta) 5 mg tablet Take 1 tablet (5 mg) by mouth once daily.       busPIRone (Buspar) 15 mg tablet Take 1 tablet (15 mg) by mouth 2 times a day.       carBAMazepine (TEGretol) 200 mg tablet Take 1 tablet (200 mg) by mouth 2 times a day.       DULoxetine (Cymbalta) 60 mg DR capsule Take 1 capsule (60 mg) by mouth once daily.       estradiol (Estrace) 0.01 % (0.1 mg/gram) vaginal cream Insert 0.5 g into the vagina 2 times a week.       fluticasone propion-salmeteroL (Advair Diskus) 250-50 mcg/dose diskus inhaler Inhale 1 puff 2 times a day.       fluticasone propion-salmeteroL (Advair Diskus) 250-50 mcg/dose diskus inhaler Inhale 1 puff 2 times a day.       gabapentin (Neurontin) 300 mg capsule Take 3 capsules (900 mg) by mouth 3 times a day.       hydrALAZINE (Apresoline) 10 mg tablet Take 1 tablet (10 mg) by mouth 3 times a day.       hydrOXYzine pamoate (Vistaril) 25 mg capsule Take 1 capsule (25 mg) by mouth 3 times a day as needed.       hydrOXYzine pamoate (Vistaril) 50 mg capsule Take 1 capsule (50 mg) by mouth 3 times a day as needed for anxiety.       lisinopril 20 mg tablet Take 1 tablet (20 mg) by mouth once daily.       lovastatin (Mevacor) 20 mg tablet Take 1 tablet (20 mg) by mouth once daily at bedtime.       methenamine hippurate (Hiprex) 1 gram tablet Take 1 tablet (1 g) by mouth 2 times a day.       nystatin (Mycostatin) cream Apply 100,000 g/m2 topically if needed (BID).       traMADol (Ultram) 50 mg tablet Take 1 tablet (50 mg) by mouth every 6 hours if needed.       traZODone (Desyrel) 100 mg tablet Take 1 tablet (100 mg)  by mouth once daily at bedtime.       venlafaxine 150 mg 24 hr tablet Take 1 tablet (150 mg) by mouth once daily with a meal.       venlafaxine XR (Effexor-XR) 150 mg 24 hr capsule Take 2 capsules (300 mg) by mouth once daily.       cholecalciferol (Vitamin D-3) 50 MCG (2000 UT) tablet Take 1 tablet (2,000 Units) by mouth once daily.       docusate sodium (Colace) 100 mg capsule Take 1 capsule (100 mg) by mouth 2 times a day.       HYDROcodone-acetaminophen (Norco) 5-325 mg tablet Take 1 tablet by mouth every 6 hours if needed for severe pain (7 - 10) for up to 10 doses. 10 tablet 0     melatonin 3 mg tablet Take 1 tablet (3 mg) by mouth once daily at bedtime.       [] methylPREDNISolone (Medrol Dospak) 4 mg tablets Follow schedule on package instructions 21 tablet 0     mometasone 200 mcg/actuation HFA aerosol inhaler Inhale 200 mcg 2 times a day.       omeprazole OTC (PriLOSEC OTC) 20 mg EC tablet Take 1 tablet (20 mg) by mouth 2 times a day before meals.       orphenadrine (Norflex) 100 mg 12 hr tablet Take 1 tablet (100 mg) by mouth 2 times a day as needed for muscle spasms for up to 14 doses. Do not crush, chew, or split. 14 tablet 0     tiotropium-olodateroL (Stiolto Respimat) 2.5-2.5 mcg/actuation mist inhaler Inhale 2 Inhalations once daily.          Review of Systems   Constitutional:  Positive for activity change.   Musculoskeletal:  Positive for gait problem.   Neurological:  Positive for weakness.     Neurological Exam    Nerologic exam:    Awake alert oriented to all, no dysarthria, no aphasia  Naming repetition intact, speech is fluent  PERRL, EOMI without ptosis or nystagmus, visual fields intact, face symmetrical, tongue midline  Strength in upper and lower extremities is 5/5  Sensation is intact  FTN intact JOSEPH intact  Heel-to-shin without ataxia  Hyperreflexia throughout with bilateral positive Fredrick's  Toes downgoing bilaterally  Gait not assessed at this time    Physical  "Exam  Cardiovascular:      Rate and Rhythm: Normal rate.   Pulmonary:      Effort: Pulmonary effort is normal.   Musculoskeletal:      Cervical back: Normal range of motion.   Psychiatric:         Mood and Affect: Mood normal.       Last Recorded Vitals  Blood pressure 123/57, pulse 65, temperature 36.6 °C (97.9 °F), temperature source Oral, resp. rate 17, height 1.676 m (5' 6\"), weight 98 kg (216 lb), SpO2 95 %.    Relevant Results    Results for orders placed or performed during the hospital encounter of 10/23/23 (from the past 24 hour(s))   Basic Metabolic Panel   Result Value Ref Range    Glucose 91 65 - 99 mg/dL    Sodium 135 133 - 145 mmol/L    Potassium 4.4 3.4 - 5.1 mmol/L    Chloride 104 97 - 107 mmol/L    Bicarbonate 21 (L) 24 - 31 mmol/L    Urea Nitrogen 35 (H) 8 - 25 mg/dL    Creatinine 1.00 0.40 - 1.60 mg/dL    eGFR 61 >60 mL/min/1.73m*2    Calcium 9.0 8.5 - 10.4 mg/dL    Anion Gap 10 <=19 mmol/L   CBC   Result Value Ref Range    WBC 7.8 4.4 - 11.3 x10*3/uL    nRBC 0.0 0.0 - 0.0 /100 WBCs    RBC 4.09 4.00 - 5.20 x10*6/uL    Hemoglobin 12.8 12.0 - 16.0 g/dL    Hematocrit 38.4 36.0 - 46.0 %    MCV 94 80 - 100 fL    MCH 31.3 26.0 - 34.0 pg    MCHC 33.3 32.0 - 36.0 g/dL    RDW 14.2 11.5 - 14.5 %    Platelets 212 150 - 450 x10*3/uL    MPV 10.6 7.5 - 11.5 fL     Current Facility-Administered Medications:     acetaminophen (Tylenol) tablet 650 mg, 650 mg, oral, q4h PRN **OR** acetaminophen (Tylenol) oral liquid 650 mg, 650 mg, oral, q4h PRN **OR** acetaminophen (Tylenol) suppository 650 mg, 650 mg, rectal, q4h PRN, Stan eParce MD    acetaminophen (Tylenol) tablet 325 mg, 325 mg, oral, q6h PRN, Stan Pearce MD    acetaminophen (Tylenol) tablet 650 mg, 650 mg, oral, Once, Stan Pearce MD    albuterol 2.5 mg /3 mL (0.083 %) nebulizer solution 2.5 mg, 2.5 mg, nebulization, q2h PRN, Stan Pearce MD    albuterol 90 mcg/actuation inhaler 2 puff, 2 puff, inhalation, q6h PRN, Stan HALL" MD Portia    amLODIPine (Norvasc) tablet 10 mg, 10 mg, oral, Daily, Stan Pearce MD, 10 mg at 10/25/23 0811    aspirin EC tablet 81 mg, 81 mg, oral, Daily, Stan Pearce MD, 81 mg at 10/25/23 0811    atorvastatin (Lipitor) tablet 40 mg, 40 mg, oral, Nightly, Stan Pearce MD, 40 mg at 10/24/23 2122    benzocaine-menthol (Cepastat Sore Throat) 15-3.6 mg lozenge 1 lozenge, 1 lozenge, Mouth/Throat, q2h PRN, Stan Pearce MD    bisoprolol (Zebeta) tablet 5 mg, 5 mg, oral, Daily, Stan Pearce MD, 5 mg at 10/25/23 0811    busPIRone (Buspar) tablet 15 mg, 15 mg, oral, BID, Stan Pearce MD, 15 mg at 10/25/23 0810    carBAMazepine (TEGretol) tablet 600 mg, 600 mg, oral, BID with meals, Stan Pearce MD, 600 mg at 10/25/23 0810    cefTRIAXone (Rocephin) IVPB 1 g, 1 g, intravenous, q24h, Stan Pearce MD, Stopped at 10/24/23 1746    cholecalciferol (Vitamin D-3) tablet 2,000 Units, 2,000 Units, oral, Daily, Stan Pearce MD, 2,000 Units at 10/25/23 0811    dextromethorphan-guaifenesin (Robitussin DM)  mg/5 mL oral liquid 5 mL, 5 mL, oral, q4h PRN, Stan Pearce MD    docusate sodium (Colace) capsule 100 mg, 100 mg, oral, BID, Stan Pearce MD, 100 mg at 10/25/23 0811    estradiol (Estrace) 0.01 % (0.1 mg/gram) vaginal cream 0.5 g, 0.5 g, vaginal, Once per day on Tue Fri, Stan Pearce MD    fluticasone furoate-vilanteroL (Breo Ellipta) 200-25 mcg/dose inhaler 1 puff, 1 puff, inhalation, Daily, Stan Pearce MD, 1 puff at 10/25/23 0716    tiotropium (Spiriva Respimat) 2.5 mcg/actuation inhaler 2 puff, 2 Inhalation, inhalation, Daily, 2 puff at 10/25/23 0716 **AND** formoterol (Perforomist) 20 mcg/2 mL nebulizer solution 20 mcg, 20 mcg, nebulization, q12h, Stan Pearce MD, 20 mcg at 10/25/23 0716    gabapentin (Neurontin) capsule 900 mg, 900 mg, oral, TID, Stan Pearce MD, 900 mg at 10/25/23 0810    guaiFENesin (Mucinex) 12 hr  tablet 600 mg, 600 mg, oral, q12h PRN, Stan Pearce MD    heparin (porcine) injection 5,000 Units, 5,000 Units, subcutaneous, q8h, Stan Pearce MD, 5,000 Units at 10/25/23 0636    hydrALAZINE (Apresoline) tablet 10 mg, 10 mg, oral, TID, Stan Pearce MD, 10 mg at 10/25/23 0811    HYDROcodone-acetaminophen (Norco) 5-325 mg per tablet 1 tablet, 1 tablet, oral, q6h PRN, Stan Pearce MD, 1 tablet at 10/25/23 1011    hydrOXYzine pamoate (Vistaril) capsule 25 mg, 25 mg, oral, Daily PRN, Stan Pearce MD    lidocaine 4 % patch 1 patch, 1 patch, transdermal, Daily, Becca Lucas, MILO-CNP, 1 patch at 10/25/23 0811    melatonin tablet 3 mg, 3 mg, oral, Nightly, Stan Pearce MD, 3 mg at 10/24/23 2122    methenamine hippurate (Hiprex) tablet 1 g, 1 g, oral, BID, Stan Pearce MD    nystatin (Mycostatin) cream, , Topical, PRN, Stan Pearce MD    orphenadrine (Norflex) 12 hr tablet 100 mg, 100 mg, oral, BID PRN, Stan Pearce MD    pantoprazole (ProtoNix) EC tablet 40 mg, 40 mg, oral, Daily before breakfast, Stan Pearce MD, 40 mg at 10/25/23 0636    polyethylene glycol (Glycolax, Miralax) packet 17 g, 17 g, oral, Daily PRN, Stan Pearce MD    traMADol (Ultram) tablet 50 mg, 50 mg, oral, q6h PRN, Stan Pearce MD    venlafaxine XR (Effexor-XR) 24 hr capsule 300 mg, 300 mg, oral, Daily, Stan Pearce MD, 300 mg at 10/25/23 0810                  [place] Coma Scale  Best Eye Response: Spontaneous  Best Verbal Response: Oriented  Best Motor Response: Follows commands  Lay Coma Scale Score: 15                 I have personally reviewed the following imaging results XR chest 2 views    Result Date: 10/23/2023  Interpreted By:  Jamil Escalera, STUDY: XR CHEST 2 VIEWS;  10/23/2023 2:13 pm   INDICATION: Signs/Symptoms:weakness fall.   COMPARISON: 08/23/2023.   ACCESSION NUMBER(S): [id number]   ORDERING CLINICIAN: NICOLE ENRIQUEZ   FINDINGS:          CARDIOMEDIASTINAL SILHOUETTE: Cardiomediastinal silhouette is normal in size and configuration.   LUNGS: Lungs are clear.   ABDOMEN: No remarkable upper abdominal findings.   BONES: No acute osseous changes.       No evidence of acute cardiopulmonary process.     MACRO: None   Signed by: Jamil Escalera 10/23/2023 2:14 PM Dictation workstation:   [id number]    CT head wo IV contrast    Result Date: 10/23/2023  Interpreted By:  Jamil Escalera, STUDY: CT HEAD WO IV CONTRAST;  10/23/2023 12:53 pm   INDICATION: fall head injury.   COMPARISON: 08/24/2023.   ACCESSION NUMBER(S): [id number]   ORDERING CLINICIAN: NICOLE ENRIQUEZ   TECHNIQUE: Noncontrast axial CT scan of head was performed. Angled reformats in brain and bone windows were generated. The images were reviewed in bone, brain, blood and soft tissue windows.   FINDINGS: CSF Spaces: Ex vacuo dilation of the ventricles. Sulci and basal cisterns are within normal limits. There is no extraaxial fluid collection.   Parenchyma: Chronic bilateral white matter microvascular disease.. There is no mass effect or midline shift.  There is no intracranial hemorrhage.   Calvarium: The calvarium is unremarkable.   Paranasal sinuses and mastoids: Visualized paranasal sinuses and mastoids are clear.   Left frontoparietal scalp laceration.       1. No acute intracranial findings. 2. Left frontoparietal scalp laceration.   MACRO: None   Signed by: Jamil Escalera 10/23/2023 1:05 PM Dictation workstation:   [id number]    CT cervical spine wo IV contrast    Result Date: 10/23/2023  Interpreted By:  Jamil Escalera, STUDY: CT CERVICAL SPINE WO IV CONTRAST;  10/23/2023 12:53 pm   INDICATION: Signs/Symptoms:fall.   COMPARISON: None.   ACCESSION NUMBER(S): [id number]   ORDERING CLINICIAN: NICOLE ENRIQUEZ   TECHNIQUE: Axial CT images of the cervical spine are obtained. Axial, coronal and sagittal reconstructions are provided for review.   FINDINGS: 7 cervical vertebrae. Cervical spine alignment is normal.  Vertebral body heights are normal. No fractures. No suspicious osseous lesions. ACDF spanning C4 through C6, with interbody grafts at C4-C5 and C5-C6.   Visualized soft tissues of the neck do not demonstrate any suspicious findings. The lung apices clear.   C2-C3: Normal.   C3-C4: Disc osteophyte complex. No spinal canal or foraminal stenosis.   C4-C5: Surgical level. Mild bilateral foraminal stenosis. No spinal canal stenosis.   C5-C6: Surgical level. Mild bilateral foraminal stenosis. No spinal canal stenosis.   C6-7: Disc osteophyte complex. Severe right and mild left foraminal stenosis. No spinal canal stenosis.   C7-T1: Normal.         1. No acute cervical spine findings.   2. Degenerative changes of the cervical spine, including severe right foraminal stenosis at C6-C7 without spinal canal stenosis.   MACRO: None   Signed by: Jamil Escalera 10/23/2023 1:03 PM Dictation workstation:   [id number]    XR hip right 2 or 3 views    Result Date: 10/17/2023  Interpreted By:  Jamil Escalera, STUDY: XR HIP RIGHT 2 OR 3 VIEWS; ;  10/17/2023 4:08 pm   INDICATION: Signs/Symptoms:hip pain.   COMPARISON: None.   ACCESSION NUMBER(S): [id number]   ORDERING CLINICIAN: JLUIS LANDAVERDE   FINDINGS: No fractures. No subluxations. No radiopaque foreign bodies. No suspicious osseous lesions. Diffuse bony demineralization.       No acute findings.     MACRO: None   Signed by: Jamil Escalera 10/17/2023 4:17 PM Dictation workstation:   [id number]    XR lumbar spine 2-3 views    Result Date: 10/17/2023  Interpreted By:  Jamil Escalera, STUDY: XR LUMBAR SPINE 2-3 VIEWS; ;  10/17/2023 4:08 pm   INDICATION: Signs/Symptoms:back pain.   COMPARISON: 03/08/2016.   ACCESSION NUMBER(S): [id number]   ORDERING CLINICIAN: JLUIS LANDAVERDE   FINDINGS: Posterior spinal fusion spanning L3-S1. Interbody graft at L3-L4. Hardware appears intact. Retrolisthesis of L3 on L4. Degenerative endplate changes at L3-L4 through L5-S1. No fractures. Mild progression of adjacent disc  disease at L2-L3, evidenced by new gas at the L2-L3 disc space. No suspicious bony lesions. Atherosclerotic disease of the abdominal aorta.       1. No acute lumbar spine findings. 2. Interval mild progression of adjacent disc disease at L2-L3.     MACRO: None   Signed by: Jamil Escalera 10/17/2023 4:15 PM Dictation workstation:   [id number]       Assessment/Plan   Principal Problem:    Closed head injury, initial encounter  Active Problems:    Closed head injury    Scalp laceration    Acute cystitis without hematuria    Generalized weakness    Fall    Functional assessment declined    68-year-old female with history of COPD, HTN, gait instability presented to the emergency department after a fall with closed head injury to the left forehead.  On exam she is not weak and has no focal deficit.  Does note hyperreflexia.  CT of the brain and CT of the cervical spine without acute findings CT of the cervical spine does note severe stenosis at C6/7.  Recommend an MRI of the cervical spine given hyperreflexia on exam however patient refuses.  This we will sign off and she can follow-up in office in 2 to 4 weeks.  Recommend physical therapy.    Shalini Hawk, APRN-CNP

## 2023-10-25 NOTE — PROGRESS NOTES
Babar Quigley is a 68 y.o. female on day 1 of admission presenting with Closed head injury, initial encounter.    Subjective   Patient seen and examined.  Resting in bed in no acute distress.  Awake alert oriented x 3.  No complaints.  Asking to go home    Objective     Physical Exam  Vitals reviewed.   Constitutional:       General: She is not in acute distress.     Appearance: Normal appearance. She is obese. She is not ill-appearing or toxic-appearing.   HENT:      Head: Normocephalic and atraumatic.      Right Ear: Tympanic membrane normal.      Left Ear: Tympanic membrane normal.      Nose: Nose normal.      Mouth/Throat:      Mouth: Mucous membranes are moist.      Pharynx: Oropharynx is clear.   Eyes:      Extraocular Movements: Extraocular movements intact.      Conjunctiva/sclera: Conjunctivae normal.      Pupils: Pupils are equal, round, and reactive to light.   Cardiovascular:      Rate and Rhythm: Normal rate and regular rhythm.      Pulses: Normal pulses.      Heart sounds: Normal heart sounds. No murmur heard.  Pulmonary:      Effort: Pulmonary effort is normal. No respiratory distress.      Breath sounds: Normal breath sounds. No wheezing, rhonchi or rales.   Abdominal:      General: Bowel sounds are normal. There is no distension.      Palpations: Abdomen is soft.      Tenderness: There is no abdominal tenderness.   Genitourinary:     Comments: Rectal examination deferred  Musculoskeletal:         General: No swelling. Normal range of motion.      Cervical back: Normal range of motion and neck supple.   Skin:     General: Skin is warm and dry.      Capillary Refill: Capillary refill takes less than 2 seconds.   Neurological:      General: No focal deficit present.      Mental Status: She is alert and oriented to person, place, and time.      Comments: Follows all commands. No weakness   Psychiatric:         Mood and Affect: Mood normal.         Behavior: Behavior normal.       Last Recorded  "Vitals  Blood pressure 118/54, pulse 68, temperature 36.8 °C (98.2 °F), temperature source Oral, resp. rate 17, height 1.676 m (5' 6\"), weight 98 kg (216 lb), SpO2 94 %.    Intake/Output last 3 Shifts:  I/O last 3 completed shifts:  In: 375 (3.8 mL/kg) [P.O.:325; IV Piggyback:50]  Out: - (0 mL/kg)   Weight: 98 kg     Relevant Results  Results for orders placed or performed during the hospital encounter of 10/23/23 (from the past 24 hour(s))   Comprehensive metabolic panel   Result Value Ref Range    Glucose 96 65 - 99 mg/dL    Sodium 133 133 - 145 mmol/L    Potassium 3.9 3.4 - 5.1 mmol/L    Chloride 100 97 - 107 mmol/L    Bicarbonate 21 (L) 24 - 31 mmol/L    Urea Nitrogen 28 (H) 8 - 25 mg/dL    Creatinine 0.80 0.40 - 1.60 mg/dL    eGFR 80 >60 mL/min/1.73m*2    Calcium 9.1 8.5 - 10.4 mg/dL    Albumin 3.9 3.5 - 5.0 g/dL    Alkaline Phosphatase 136 (H) 35 - 125 U/L    Total Protein 6.5 5.9 - 7.9 g/dL    AST 17 5 - 40 U/L    Bilirubin, Total 0.4 0.1 - 1.2 mg/dL    ALT 28 5 - 40 U/L    Anion Gap 12 <=19 mmol/L   CBC   Result Value Ref Range    WBC 9.7 4.4 - 11.3 x10*3/uL    nRBC 0.0 0.0 - 0.0 /100 WBCs    RBC 4.41 4.00 - 5.20 x10*6/uL    Hemoglobin 13.8 12.0 - 16.0 g/dL    Hematocrit 40.8 36.0 - 46.0 %    MCV 93 80 - 100 fL    MCH 31.3 26.0 - 34.0 pg    MCHC 33.8 32.0 - 36.0 g/dL    RDW 14.4 11.5 - 14.5 %    Platelets 246 150 - 450 x10*3/uL    MPV 10.9 7.5 - 11.5 fL     XR chest 2 views    Result Date: 10/23/2023  Interpreted By:  Jamil Escalera, STUDY: XR CHEST 2 VIEWS;  10/23/2023 2:13 pm   INDICATION: Signs/Symptoms:weakness fall.   COMPARISON: 08/23/2023.   ACCESSION NUMBER(S): [id number]   ORDERING CLINICIAN: NICOLE ENRIQUEZ   FINDINGS:         CARDIOMEDIASTINAL SILHOUETTE: Cardiomediastinal silhouette is normal in size and configuration.   LUNGS: Lungs are clear.   ABDOMEN: No remarkable upper abdominal findings.   BONES: No acute osseous changes.       No evidence of acute cardiopulmonary process.     MACRO: None   " Signed by: Jamil Escalera 10/23/2023 2:14 PM Dictation workstation:   [id number]    CT head wo IV contrast    Result Date: 10/23/2023  Interpreted By:  Jamil Escalera, STUDY: CT HEAD WO IV CONTRAST;  10/23/2023 12:53 pm   INDICATION: fall head injury.   COMPARISON: 08/24/2023.   ACCESSION NUMBER(S): [id number]   ORDERING CLINICIAN: NICOLE ENRIQUEZ   TECHNIQUE: Noncontrast axial CT scan of head was performed. Angled reformats in brain and bone windows were generated. The images were reviewed in bone, brain, blood and soft tissue windows.   FINDINGS: CSF Spaces: Ex vacuo dilation of the ventricles. Sulci and basal cisterns are within normal limits. There is no extraaxial fluid collection.   Parenchyma: Chronic bilateral white matter microvascular disease.. There is no mass effect or midline shift.  There is no intracranial hemorrhage.   Calvarium: The calvarium is unremarkable.   Paranasal sinuses and mastoids: Visualized paranasal sinuses and mastoids are clear.   Left frontoparietal scalp laceration.       1. No acute intracranial findings. 2. Left frontoparietal scalp laceration.   MACRO: None   Signed by: Jamil Escalera 10/23/2023 1:05 PM Dictation workstation:   [id number]    CT cervical spine wo IV contrast    Result Date: 10/23/2023  Interpreted By:  Jamil Escalera, STUDY: CT CERVICAL SPINE WO IV CONTRAST;  10/23/2023 12:53 pm   INDICATION: Signs/Symptoms:fall.   COMPARISON: None.   ACCESSION NUMBER(S): [id number]   ORDERING CLINICIAN: NICOLE ENRIQUEZ   TECHNIQUE: Axial CT images of the cervical spine are obtained. Axial, coronal and sagittal reconstructions are provided for review.   FINDINGS: 7 cervical vertebrae. Cervical spine alignment is normal. Vertebral body heights are normal. No fractures. No suspicious osseous lesions. ACDF spanning C4 through C6, with interbody grafts at C4-C5 and C5-C6.   Visualized soft tissues of the neck do not demonstrate any suspicious findings. The lung apices clear.   C2-C3: Normal.   C3-C4:  Disc osteophyte complex. No spinal canal or foraminal stenosis.   C4-C5: Surgical level. Mild bilateral foraminal stenosis. No spinal canal stenosis.   C5-C6: Surgical level. Mild bilateral foraminal stenosis. No spinal canal stenosis.   C6-7: Disc osteophyte complex. Severe right and mild left foraminal stenosis. No spinal canal stenosis.   C7-T1: Normal.         1. No acute cervical spine findings.   2. Degenerative changes of the cervical spine, including severe right foraminal stenosis at C6-C7 without spinal canal stenosis.   MACRO: None   Signed by: Jamil Escalera 10/23/2023 1:03 PM Dictation workstation:   [id number]     Scheduled medications  acetaminophen, 650 mg, oral, Once  amLODIPine, 10 mg, oral, Daily  aspirin, 81 mg, oral, Daily  atorvastatin, 40 mg, oral, Nightly  bisoprolol, 5 mg, oral, Daily  busPIRone, 15 mg, oral, BID  carBAMazepine, 600 mg, oral, BID with meals  cefTRIAXone, 1 g, intravenous, q24h  cholecalciferol, 2,000 Units, oral, Daily  docusate sodium, 100 mg, oral, BID  estradiol, 0.5 g, vaginal, Once per day on Tue Fri  fluticasone furoate-vilanteroL, 1 puff, inhalation, Daily  tiotropium, 2 Inhalation, inhalation, Daily   And  formoterol, 20 mcg, nebulization, q12h  gabapentin, 900 mg, oral, TID  heparin (porcine), 5,000 Units, subcutaneous, q8h  hydrALAZINE, 10 mg, oral, TID  lidocaine, 1 patch, transdermal, Daily  melatonin, 3 mg, oral, Nightly  methenamine hippurate, 1 g, oral, BID  pantoprazole, 40 mg, oral, Daily before breakfast  venlafaxine XR, 300 mg, oral, Daily      Continuous medications     PRN medications  PRN medications: acetaminophen **OR** acetaminophen **OR** acetaminophen, acetaminophen, albuterol, albuterol, benzocaine-menthol, dextromethorphan-guaifenesin, guaiFENesin, HYDROcodone-acetaminophen, hydrOXYzine pamoate, nystatin, orphenadrine, polyethylene glycol, traMADol    ASSESSMENT:  Falls  Closed head injury  Generalized weakness  Functional  decline  Osteoarthritis  Cervical stenosis severe C 6-7  Chronic pain  Acute cystitis  Pyuria, UTI  Hypertension  Hyperlipidemia  COPD  Depression    PLAN:  Neurology consultation generalized weakness, inability to ambulate.  No focal weakness on examination.  PT/OT.  Fall precautions.  Up with assistance only.  Bed and chair alarm at all times.  IV Ceftriaxone.  Follow up urine culture result.  Monitor temperature and white blood cell count.  P.r.n analgesics.  Deep vein thrombosis prophylaxis Heparin subcutaneous.  Supportive care.  Patient reassured.  Case management following for discharge planning.  Anticipate discharge pending urine culture, when arrangements are made by case management.  Discussed with Dr. Pearce.     ADDENDUM:  Spoke with [facility] management, [facility].  No safe discharge plan at this time    Becca Lucas, MILO-CNP

## 2023-10-25 NOTE — PROGRESS NOTES
Occupational Therapy    Occupational Therapy Treatment    Name: Babar Quigley  MRN: [id number]  : 1955  Date: 10/25/23  Time Calculation  Start Time: 1500  Stop Time: 1523  Time Calculation (min): 23 min    Assessment:  OT Assessment: Pt continues to demonstrate cognitive deficits and safety impairments with functional mobility. Pt declined all ADLs this date.  Prognosis: Good  Barriers to Discharge: Decreased caregiver support (falls when aids not available)  Evaluation/Treatment Tolerance: Patient limited by fatigue  Medical Staff Made Aware: Yes  End of Session Communication: Bedside nurse  End of Session Patient Position: Bed, 2 rail up, Alarm off, not on at start of session  Plan:  Treatment Interventions: ADL retraining, Functional transfer training, UE strengthening/ROM, Endurance training, Cognitive reorientation, Equipment evaluation/education, Patient/family training  OT Frequency: 3 times per week  OT Discharge Recommendations: Moderate intensity level of continued care  Equipment Recommended upon Discharge: Wheeled walker  OT Recommended Transfer Status: Minimal assist    Subjective   General:  OT Last Visit  OT Received On: 10/25/23  General  Reason for Referral: Closed head injury; impaired mobility  Referred By: Portia  Past Medical History Relevant to Rehab: COPD, HTN, arthritis, nueropathy, recurrent falls, dyslipidemia, anxiety, UTI, spine surgery  Missed Visit: No  Prior to Session Communication: Bedside nurse  Patient Position Received: Bed, 2 rail up  Preferred Learning Style: auditory  General Comment: Pt supine in bed on arrival, agreeable to OT treatment.  Vitals:     Pain Assessment:  Pain Assessment  Pain Assessment: 0-10  Pain Score: 4  Pain Type: Chronic pain  Pain Location: Hip  Pain Orientation: Right  Pain Interventions: Repositioned     Objective   Activities of Daily Living: Grooming  Grooming Comments: Pt declined ADLs    Functional Standing Tolerance:     Bed  Mobility/Transfers: Bed Mobility  Bed Mobility: Yes  Bed Mobility 1  Bed Mobility 1: Supine to sitting  Level of Assistance 1: Close supervision  Bed Mobility Comments 1: Pt performed bed mobility with close S HOB flat.  Bed Mobility 2  Bed Mobility  2: Sitting to supine  Level of Assistance 2: Close supervision  Bed Mobility Comments 2: returned to supine with needs in reach. Bed alarm not on at start of session.    Transfers  Transfer: Yes  Transfer 1  Transfer From 1: Sit to  Transfer to 1: Stand  Technique 1: Sit to stand  Transfer Device 1: Walker  Transfer Level of Assistance 1: Close supervision  Trials/Comments 1: Pt stood from bed with close S and RW support. Performed functional mobility household distance to and from bathroom in room. Pt unsteady, moves quickly required cues for safety, impulsive. Returned to bed needs in reach.    Therapy/Activity: Therapeutic Exercise  Therapeutic Exercise Performed: Yes  Therapeutic Exercise Activity 1: Bicep curls x20  Therapeutic Exercise Activity 2: shoulder flexion x20  Therapeutic Exercise Activity 3: shoulder ab/adduction x15  Outcome Measures:  [facility] Daily Activity  Putting on and taking off regular lower body clothing: A little  Bathing (including washing, rinsing, drying): A little  Putting on and taking off regular upper body clothing: A little  Toileting, which includes using toilet, bedpan or urinal: A little  Taking care of personal grooming such as brushing teeth: A little  Eating Meals: None  Daily Activity - Total Score: 19        Education Documentation  No documentation found.  Education Comments  No comments found.      Goals:  Encounter Problems       Encounter Problems (Active)       OT Goals       Pt will perform functional mobility household distance at mod ind level with RW  (Progressing)       Start:  10/24/23    Expected End:  11/10/23            Pt will complete ADL tasks with Mod I, using AE as needed, in order to complete self-care tasks.   (Progressing)       Start:  10/24/23    Expected End:  11/10/23            Pt will perform upper body therapeutic exercises all joints/planes of motion independently (Progressing)       Start:  10/24/23    Expected End:  11/10/23            Pt will perform functional transfers at mod ind level with RW  (Progressing)       Start:  10/24/23    Expected End:  11/10/23

## 2023-10-25 NOTE — PROGRESS NOTES
Physical Therapy    Physical Therapy Treatment    Patient Name: Babar Quigley  MRN: [id number]  Today's Date: 10/25/2023  Time Calculation  Start Time: 0940  Stop Time: 1004  Time Calculation (min): 24 min       Assessment/Plan   PT Assessment  PT Assessment Results: Decreased strength, Decreased endurance, Impaired balance, Decreased mobility, Decreased coordination, Decreased safety awareness  Rehab Prognosis: Good  Evaluation/Treatment Tolerance: Patient limited by fatigue, Patient limited by pain  Medical Staff Made Aware: Yes  End of Session Communication: Bedside nurse  End of Session Patient Position: Bed, 2 rail up, Alarm on  PT Plan  Inpatient/Swing Bed or Outpatient: Inpatient  PT Plan  Treatment/Interventions: Bed mobility, Transfer training, Gait training, Balance training, Strengthening, Endurance training, Therapeutic exercise, Therapeutic activity  PT Plan: Skilled PT  PT Frequency: 4 times per week  PT Discharge Recommendations: Moderate intensity level of continued care  Equipment Recommended upon Discharge: Wheeled walker  PT Recommended Transfer Status: Assist x1  PT - OK to Discharge: Yes (With continued skilled physical therapy services at next level of care.)      General Visit Information:   PT  Visit  PT Received On: 10/25/23  Response to Previous Treatment: Patient with no complaints from previous session.  General  Reason for Referral: Closed head injury; impaired mobility  Referred By: Portia  Past Medical History Relevant to Rehab: COPD, HTN, arthritis, nueropathy, recurrent falls, dyslipidemia, anxiety, UTI, spine surgery  Missed Visit: No  Prior to Session Communication: Bedside nurse  Patient Position Received: Up in bathroom  Preferred Learning Style: auditory    Subjective   Precautions:  Precautions  Medical Precautions: Fall precautions  Precautions Comment: Heart monitor  Vital Signs:     Objective   Pain:  Pain Assessment  Pain Assessment: 0-10  Pain Score: 8  Pain Type: Chronic  pain  Pain Location: Hip  Pain Orientation: Right  Pain Radiating Towards: Foot  Pain Descriptors: Aching, Burning, Discomfort  Pain Frequency: Constant/continuous  Pain Interventions: Rest  Response to Interventions: Improved with rest, sidelying on left, pillow between legs  Cognition:  Cognition  Overall Cognitive Status: Within Functional Limits    Activity Tolerance:  Activity Tolerance  Endurance: Tolerates 10 - 20 min exercise with multiple rests  Treatments:  Therapeutic Activity  Therapeutic Activity Performed: Yes  Therapeutic Activity 1: Transfer training/toileting    Bed Mobility  Bed Mobility: Yes  Bed Mobility 1  Bed Mobility 1: Sitting to supine  Level of Assistance 1: Close supervision  Bed Mobility Comments 1: Increased time and effort, side rail for assist  Bed Mobility 2  Bed Mobility  2: Side lying left to sit  Level of Assistance 2: Close supervision    Ambulation/Gait Training  Ambulation/Gait Training Performed: Yes  Ambulation/Gait Training 1  Surface 1: Level tile  Device 1: Rolling walker  Assistance 1: Minimum assistance  Quality of Gait 1: Diminished heel strike  Comments/Distance (ft) 1: 75'x2; mild forward flexed posture, verbal cueing for AD proximity  Ambulation/Gait Training 2  Surface 2: Level tile  Device 2: Rolling walker  Assistance 2: Minimum assistance  Quality of Gait 2: Diminished heel strike  Comments/Distance (ft) 2: 50'x2; mild forward flexed posture; verbal ccueing for AD proximity.  Transfers  Transfer: Yes  Transfer 1  Transfer From 1: Toilet to  Transfer to 1: Stand  Technique 1: Sit to stand  Transfer Device 1: Walker  Transfer Level of Assistance 1: Minimum assistance  Trials/Comments 1: Verbal cueing for hand placement  Transfers 2  Transfer From 2: Stand to  Transfer to 2: Chair with arms  Technique 2: Stand to sit  Transfer Device 2: Walker  Transfer Level of Assistance 2: Minimum assistance, Minimal verbal cues  Trials/Comments 2: x2 trials  Transfers 3  Transfer  From 3: Chair with arms to  Transfer to 3: Stand  Technique 3: Sit to stand  Transfer Device 3: Walker  Transfer Level of Assistance 3: Minimal verbal cues, Minimum assistance  Transfers 4  Transfer From 4: Stand to  Transfer to 4: Bed  Technique 4: Stand to sit  Transfer Device 4: Walker  Transfer Level of Assistance 4: Minimum assistance, Minimal tactile cues    Stairs  Stairs: No    Outcome Measures:  [facility] Basic Mobility  Turning from your back to your side while in a flat bed without using bedrails: None  Moving from lying on your back to sitting on the side of a flat bed without using bedrails: A little  Moving to and from bed to chair (including a wheelchair): A little  Standing up from a chair using your arms (e.g. wheelchair or bedside chair): A little  To walk in hospital room: A little  Climbing 3-5 steps with railing: A lot  Basic Mobility - Total Score: 18    Education Documentation  Precautions, taught by Adry Padilla PTA at 10/25/2023 11:51 AM.  Learner: Patient  Readiness: Acceptance  Method: Explanation  Response: Verbalizes Understanding, Needs Reinforcement    Home Exercise Program, taught by Adry Padilla PTA at 10/25/2023 11:51 AM.  Learner: Patient  Readiness: Acceptance  Method: Explanation  Response: Verbalizes Understanding, Needs Reinforcement    ADL Training, taught by Adry Padilla PTA at 10/25/2023 11:51 AM.  Learner: Patient  Readiness: Acceptance  Method: Explanation  Response: Verbalizes Understanding, Needs Reinforcement    Mobility Training, taught by Adry Padilla PTA at 10/25/2023 11:51 AM.  Learner: Patient  Readiness: Acceptance  Method: Explanation  Response: Verbalizes Understanding, Needs Reinforcement    Education Comments  No comments found.        OP EDUCATION:  Education  Individual(s) Educated: Patient  Education Provided: Body Mechanics, Fall Risk, Posture  Patient Response to Education: Patient/Caregiver Verbalized Understanding of Information    Encounter  Problems       Encounter Problems (Active)       PT Problem       Patient will ambulate 50 distance using walker with supervision (Progressing)       Start:  10/24/23    Expected End:  10/24/23            Patient will perform chair to and from bed transfer with supervision (Progressing)       Start:  10/24/23    Expected End:  10/24/23            Patient will perform supine to sit on bed with independent demonstrating control (Progressing)       Start:  10/24/23    Expected End:  10/24/23            Patient will perform sit to supine on bed with independent demonstrating control (Progressing)       Start:  10/24/23    Expected End:  10/24/23

## 2023-10-25 NOTE — CARE PLAN
[facility]; the contact from the [facility] is [email or web address]  Pt is ready for discharge  Care Coordinator to follow up with the [facility]; Pt is declining SNF; sts she has 2 little dogs at home; Care Coordinator will also speak with pt and her POA/niece Kanchan De La Torre  Sent email to the [facility] rep named Constance De Paz ([email or web address]; waiting for response. Also called Shelly Durand from the [facility] at [phone] and left message on her phone concerning this pt.   Pt has Medicare as the primary; per previous Care Coordinator notes, the family would like [facility] in [place]. This Care Coordinator sent referral to [facility]; waiting for response. Pt could go to a SNF under [facility], then transition to LTC.  Waiting for response from the [facility] and [facility]      DISCHARGE PLAN: [facility]--DO NOT DISCHARGE PATIENT BEFORE SPEAKING WITH CARE COORDINATION; PT DOES NOT YET HAVE A SAFE DISCHARGE PLAN IN PLAN

## 2023-10-25 NOTE — CARE PLAN
The patient's goals for the shift include      The clinical goals for the shift include maintian safety (maintain safety)    Problem: Fall/Injury  Goal: Not fall by end of shift  Outcome: Progressing  Goal: Be free from injury by end of the shift  Outcome: Progressing  Goal: Verbalize understanding of personal risk factors for fall in the hospital  Outcome: Progressing  Goal: Verbalize understanding of risk factor reduction measures to prevent injury from fall in the home  Outcome: Progressing  Goal: Use assistive devices by end of the shift  Outcome: Progressing  Goal: Pace activities to prevent fatigue by end of the shift  Outcome: Progressing     Problem: Respiratory  Goal: Minimize anxiety/maximize coping throughout shift  Outcome: Progressing  Goal: No signs of respiratory distress (eg. Use of accessory muscles. Peds grunting)  Outcome: Progressing  Goal: Verbalize decreased shortness of breath this shift  Outcome: Progressing

## 2023-10-25 NOTE — CARE PLAN
Problem: Fall/Injury  Goal: Not fall by end of shift  Outcome: Progressing  Goal: Be free from injury by end of the shift  Outcome: Progressing  Goal: Verbalize understanding of personal risk factors for fall in the hospital  Outcome: Progressing  Goal: Verbalize understanding of risk factor reduction measures to prevent injury from fall in the home  Outcome: Progressing  Goal: Use assistive devices by end of the shift  Outcome: Progressing  Goal: Pace activities to prevent fatigue by end of the shift  Outcome: Progressing     Problem: Respiratory  Goal: Minimize anxiety/maximize coping throughout shift  Outcome: Progressing  Goal: No signs of respiratory distress (eg. Use of accessory muscles. Peds grunting)  Outcome: Progressing  Goal: Verbalize decreased shortness of breath this shift  Outcome: Progressing   The patient's goals for the shift include      The clinical goals for the shift include no falls, antibiotics

## 2023-10-26 LAB
ANION GAP SERPL CALC-SCNC: 9 MMOL/L
BUN SERPL-MCNC: 33 MG/DL (ref 8–25)
CALCIUM SERPL-MCNC: 8.9 MG/DL (ref 8.5–10.4)
CHLORIDE SERPL-SCNC: 104 MMOL/L (ref 97–107)
CO2 SERPL-SCNC: 22 MMOL/L (ref 24–31)
CREAT SERPL-MCNC: 0.9 MG/DL (ref 0.4–1.6)
ERYTHROCYTE [DISTWIDTH] IN BLOOD BY AUTOMATED COUNT: 14.2 % (ref 11.5–14.5)
GFR SERPL CREATININE-BSD FRML MDRD: 69 ML/MIN/1.73M*2
GLUCOSE SERPL-MCNC: 94 MG/DL (ref 65–99)
HCT VFR BLD AUTO: 37.3 % (ref 36–46)
HGB BLD-MCNC: 12.5 G/DL (ref 12–16)
MCH RBC QN AUTO: 31.5 PG (ref 26–34)
MCHC RBC AUTO-ENTMCNC: 33.5 G/DL (ref 32–36)
MCV RBC AUTO: 94 FL (ref 80–100)
NRBC BLD-RTO: 0 /100 WBCS (ref 0–0)
PLATELET # BLD AUTO: 200 X10*3/UL (ref 150–450)
PMV BLD AUTO: 11.1 FL (ref 7.5–11.5)
POTASSIUM SERPL-SCNC: 4.9 MMOL/L (ref 3.4–5.1)
RBC # BLD AUTO: 3.97 X10*6/UL (ref 4–5.2)
SODIUM SERPL-SCNC: 135 MMOL/L (ref 133–145)
WBC # BLD AUTO: 6.7 X10*3/UL (ref 4.4–11.3)

## 2023-10-26 PROCEDURE — 2500000004 HC RX 250 GENERAL PHARMACY W/ HCPCS (ALT 636 FOR OP/ED): Performed by: INTERNAL MEDICINE

## 2023-10-26 PROCEDURE — 2500000005 HC RX 250 GENERAL PHARMACY W/O HCPCS: Performed by: NURSE PRACTITIONER

## 2023-10-26 PROCEDURE — 36415 COLL VENOUS BLD VENIPUNCTURE: CPT | Performed by: NURSE PRACTITIONER

## 2023-10-26 PROCEDURE — 94640 AIRWAY INHALATION TREATMENT: CPT

## 2023-10-26 PROCEDURE — 2500000001 HC RX 250 WO HCPCS SELF ADMINISTERED DRUGS (ALT 637 FOR MEDICARE OP): Performed by: INTERNAL MEDICINE

## 2023-10-26 PROCEDURE — S4991 NICOTINE PATCH NONLEGEND: HCPCS | Performed by: NURSE PRACTITIONER

## 2023-10-26 PROCEDURE — 1200000002 HC GENERAL ROOM WITH TELEMETRY DAILY

## 2023-10-26 PROCEDURE — 85027 COMPLETE CBC AUTOMATED: CPT | Performed by: NURSE PRACTITIONER

## 2023-10-26 PROCEDURE — 96372 THER/PROPH/DIAG INJ SC/IM: CPT | Performed by: INTERNAL MEDICINE

## 2023-10-26 PROCEDURE — 9420000001 HC RT PATIENT EDUCATION 5 MIN

## 2023-10-26 PROCEDURE — 2500000002 HC RX 250 W HCPCS SELF ADMINISTERED DRUGS (ALT 637 FOR MEDICARE OP, ALT 636 FOR OP/ED): Performed by: INTERNAL MEDICINE

## 2023-10-26 PROCEDURE — 2500000002 HC RX 250 W HCPCS SELF ADMINISTERED DRUGS (ALT 637 FOR MEDICARE OP, ALT 636 FOR OP/ED): Performed by: NURSE PRACTITIONER

## 2023-10-26 PROCEDURE — 80048 BASIC METABOLIC PNL TOTAL CA: CPT | Performed by: NURSE PRACTITIONER

## 2023-10-26 RX ORDER — IBUPROFEN 200 MG
1 TABLET ORAL DAILY
Status: DISCONTINUED | OUTPATIENT
Start: 2023-10-26 | End: 2023-10-27 | Stop reason: HOSPADM

## 2023-10-26 RX ADMIN — ORPHENADRINE CITRATE 100 MG: 100 TABLET, EXTENDED RELEASE ORAL at 18:01

## 2023-10-26 RX ADMIN — HYDRALAZINE HYDROCHLORIDE 10 MG: 10 TABLET, FILM COATED ORAL at 15:02

## 2023-10-26 RX ADMIN — CARBAMAZEPINE 600 MG: 200 TABLET ORAL at 17:57

## 2023-10-26 RX ADMIN — CARBAMAZEPINE 600 MG: 200 TABLET ORAL at 08:54

## 2023-10-26 RX ADMIN — PANTOPRAZOLE SODIUM 40 MG: 40 TABLET, DELAYED RELEASE ORAL at 06:35

## 2023-10-26 RX ADMIN — BUSPIRONE HYDROCHLORIDE 15 MG: 5 TABLET ORAL at 08:54

## 2023-10-26 RX ADMIN — ASPIRIN 81 MG: 81 TABLET, COATED ORAL at 08:55

## 2023-10-26 RX ADMIN — BUSPIRONE HYDROCHLORIDE 15 MG: 5 TABLET ORAL at 20:57

## 2023-10-26 RX ADMIN — LIDOCAINE 1 PATCH: 560 PATCH PERCUTANEOUS; TOPICAL; TRANSDERMAL at 08:55

## 2023-10-26 RX ADMIN — CEFTRIAXONE SODIUM 1 G: 1 INJECTION, SOLUTION INTRAVENOUS at 17:57

## 2023-10-26 RX ADMIN — HYDRALAZINE HYDROCHLORIDE 10 MG: 10 TABLET, FILM COATED ORAL at 20:56

## 2023-10-26 RX ADMIN — HEPARIN SODIUM 5000 UNITS: 5000 INJECTION, SOLUTION INTRAVENOUS; SUBCUTANEOUS at 15:02

## 2023-10-26 RX ADMIN — FLUTICASONE FUROATE AND VILANTEROL TRIFENATATE 1 PUFF: 200; 25 POWDER RESPIRATORY (INHALATION) at 07:54

## 2023-10-26 RX ADMIN — Medication 2000 UNITS: at 08:54

## 2023-10-26 RX ADMIN — HEPARIN SODIUM 5000 UNITS: 5000 INJECTION, SOLUTION INTRAVENOUS; SUBCUTANEOUS at 06:35

## 2023-10-26 RX ADMIN — HEPARIN SODIUM 5000 UNITS: 5000 INJECTION, SOLUTION INTRAVENOUS; SUBCUTANEOUS at 20:55

## 2023-10-26 RX ADMIN — HYDRALAZINE HYDROCHLORIDE 10 MG: 10 TABLET, FILM COATED ORAL at 08:54

## 2023-10-26 RX ADMIN — DOCUSATE SODIUM 100 MG: 100 CAPSULE, LIQUID FILLED ORAL at 08:54

## 2023-10-26 RX ADMIN — VENLAFAXINE HYDROCHLORIDE 300 MG: 150 CAPSULE, EXTENDED RELEASE ORAL at 08:54

## 2023-10-26 RX ADMIN — HYDROCODONE BITARTRATE AND ACETAMINOPHEN 1 TABLET: 5; 325 TABLET ORAL at 20:56

## 2023-10-26 RX ADMIN — ATORVASTATIN CALCIUM 40 MG: 40 TABLET, FILM COATED ORAL at 20:56

## 2023-10-26 RX ADMIN — DOCUSATE SODIUM 100 MG: 100 CAPSULE, LIQUID FILLED ORAL at 20:58

## 2023-10-26 RX ADMIN — HYDROCODONE BITARTRATE AND ACETAMINOPHEN 1 TABLET: 5; 325 TABLET ORAL at 08:54

## 2023-10-26 RX ADMIN — Medication 3 MG: at 20:57

## 2023-10-26 RX ADMIN — GABAPENTIN 900 MG: 300 CAPSULE ORAL at 08:54

## 2023-10-26 RX ADMIN — GABAPENTIN 900 MG: 300 CAPSULE ORAL at 15:02

## 2023-10-26 RX ADMIN — AMLODIPINE BESYLATE 10 MG: 10 TABLET ORAL at 08:55

## 2023-10-26 RX ADMIN — FORMOTEROL FUMARATE DIHYDRATE 20 MCG: 20 SOLUTION RESPIRATORY (INHALATION) at 18:59

## 2023-10-26 RX ADMIN — NICOTINE 1 PATCH: 21 PATCH, EXTENDED RELEASE TRANSDERMAL at 11:22

## 2023-10-26 RX ADMIN — FORMOTEROL FUMARATE DIHYDRATE 20 MCG: 20 SOLUTION RESPIRATORY (INHALATION) at 08:00

## 2023-10-26 RX ADMIN — GABAPENTIN 900 MG: 300 CAPSULE ORAL at 20:56

## 2023-10-26 RX ADMIN — HYDROCODONE BITARTRATE AND ACETAMINOPHEN 1 TABLET: 5; 325 TABLET ORAL at 15:07

## 2023-10-26 RX ADMIN — BISOPROLOL FUMARATE 5 MG: 5 TABLET, FILM COATED ORAL at 08:55

## 2023-10-26 RX ADMIN — TIOTROPIUM BROMIDE INHALATION SPRAY 2 PUFF: 3.12 SPRAY, METERED RESPIRATORY (INHALATION) at 07:59

## 2023-10-26 ASSESSMENT — PAIN SCALES - GENERAL
PAINLEVEL_OUTOF10: 8
PAINLEVEL_OUTOF10: 7
PAINLEVEL_OUTOF10: 4
PAINLEVEL_OUTOF10: 6
PAINLEVEL_OUTOF10: 0 - NO PAIN

## 2023-10-26 ASSESSMENT — COGNITIVE AND FUNCTIONAL STATUS - GENERAL
DRESSING REGULAR LOWER BODY CLOTHING: A LITTLE
STANDING UP FROM CHAIR USING ARMS: A LITTLE
MOBILITY SCORE: 20
DRESSING REGULAR LOWER BODY CLOTHING: A LITTLE
MOBILITY SCORE: 19
DRESSING REGULAR UPPER BODY CLOTHING: A LITTLE
DAILY ACTIVITIY SCORE: 19
CLIMB 3 TO 5 STEPS WITH RAILING: A LOT
CLIMB 3 TO 5 STEPS WITH RAILING: A LITTLE
TOILETING: A LITTLE
HELP NEEDED FOR BATHING: A LITTLE
DAILY ACTIVITIY SCORE: 19
TOILETING: A LITTLE
MOVING TO AND FROM BED TO CHAIR: A LITTLE
PERSONAL GROOMING: A LITTLE
PERSONAL GROOMING: A LITTLE
HELP NEEDED FOR BATHING: A LITTLE
STANDING UP FROM CHAIR USING ARMS: A LITTLE
MOVING TO AND FROM BED TO CHAIR: A LITTLE
WALKING IN HOSPITAL ROOM: A LITTLE
WALKING IN HOSPITAL ROOM: A LITTLE
DRESSING REGULAR UPPER BODY CLOTHING: A LITTLE

## 2023-10-26 ASSESSMENT — PAIN - FUNCTIONAL ASSESSMENT: PAIN_FUNCTIONAL_ASSESSMENT: FLACC (FACE, LEGS, ACTIVITY, CRY, CONSOLABILITY)

## 2023-10-26 NOTE — CARE PLAN
Waiting for response from [facility] for skilled to LTC  Spoke with pts anson/SILVER Kanchan De La Torre late yesterday afternoon; she is okay with pt going under skilled to any of the [facility] facilities [place]; they [place] [facility]; pt has Medicare and if accepted, can go there without a precert.    13:18 Spoke to pt in her room; pt is okay with going to a SNF; pt does not want to go to [facility] in [place]--She wants to return to [facility]; pt sts that her VA will cover it; informed pt that this Care Coordinator will call her niece and inform her.  13:25 Just spoke with pts anson/POA and per the niece, her Aunt cannot go to [facility], she has already arranged for her Aunt to go to [facility] for rehab then LTC there. This Care Coordinator informed pts niece that no one can force her Aunt to go to [facility]; pt is not deemed incompetent has the right to make poor choices.  Pts anson is going to come to the hospital to speak with pt and try to convince her to go to [facility]; per niece, the pt does not know that the plan is LTC at [facility] after Skilled.    13:46 Phoned [facility] in [place]; they confirmed that the pts niece was there and arranged for pt to go LTC; Referral sent to [facility]. Waiting for their response.      DISCHARGE PLAN: SNF--DO NOT DISCHARGE PATIENT BEFORE SPEAKING WITH CARE COORDINATION; NEED ACCEPTING FACILITY, NEED TO COMPLETE 7000 IN HENS PRIOR TO DISCHARGE.    PLAN IS FOR PT TO GO TO [facility]

## 2023-10-26 NOTE — PROGRESS NOTES
Babar Quigley is a 68 y.o. female on day 2 of admission presenting with Closed head injury, initial encounter.    Subjective   Patient seen and examined.  Resting in bed in no acute distress.  Awake alert oriented x 3.  No complaints.  States she does not want to have an MRI done.  Asking to go home.  Discussed plan to discharge to facility    Objective     Physical Exam  Vitals reviewed.   Constitutional:       General: She is not in acute distress.     Appearance: Normal appearance. She is obese. She is not ill-appearing or toxic-appearing.   HENT:      Head: Normocephalic and atraumatic.      Right Ear: Tympanic membrane normal.      Left Ear: Tympanic membrane normal.      Nose: Nose normal.      Mouth/Throat:      Mouth: Mucous membranes are moist.      Pharynx: Oropharynx is clear.   Eyes:      Extraocular Movements: Extraocular movements intact.      Conjunctiva/sclera: Conjunctivae normal.      Pupils: Pupils are equal, round, and reactive to light.   Cardiovascular:      Rate and Rhythm: Normal rate and regular rhythm.      Pulses: Normal pulses.      Heart sounds: Normal heart sounds. No murmur heard.  Pulmonary:      Effort: Pulmonary effort is normal. No respiratory distress.      Breath sounds: Normal breath sounds. No wheezing, rhonchi or rales.   Abdominal:      General: Bowel sounds are normal. There is no distension.      Palpations: Abdomen is soft.      Tenderness: There is no abdominal tenderness.   Genitourinary:     Comments: Rectal examination deferred  Musculoskeletal:         General: No swelling. Normal range of motion.      Cervical back: Normal range of motion and neck supple.   Skin:     General: Skin is warm and dry.      Capillary Refill: Capillary refill takes less than 2 seconds.      Comments: Scalp sutures in place no erythema, drainage   Neurological:      General: No focal deficit present.      Mental Status: She is alert and oriented to person, place, and time.      Comments:  "Follows all commands. No weakness   Psychiatric:         Mood and Affect: Mood normal.         Behavior: Behavior normal.       Last Recorded Vitals  Blood pressure 120/63, pulse 65, temperature 36.9 °C (98.4 °F), temperature source Oral, resp. rate 17, height 1.676 m (5' 6\"), weight 98 kg (216 lb), SpO2 99 %.    Intake/Output last 3 Shifts:  I/O last 3 completed shifts:  In: 1115 (11.4 mL/kg) [P.O.:1065; IV Piggyback:50]  Out: 0 (0 mL/kg)   Weight: 98 kg     Telemetry normal sinus rhythm rate 60's    ? Urine culture pending ?     Relevant Results  Results for orders placed or performed during the hospital encounter of 10/23/23 (from the past 24 hour(s))   Basic Metabolic Panel   Result Value Ref Range    Glucose 91 65 - 99 mg/dL    Sodium 135 133 - 145 mmol/L    Potassium 4.4 3.4 - 5.1 mmol/L    Chloride 104 97 - 107 mmol/L    Bicarbonate 21 (L) 24 - 31 mmol/L    Urea Nitrogen 35 (H) 8 - 25 mg/dL    Creatinine 1.00 0.40 - 1.60 mg/dL    eGFR 61 >60 mL/min/1.73m*2    Calcium 9.0 8.5 - 10.4 mg/dL    Anion Gap 10 <=19 mmol/L   CBC   Result Value Ref Range    WBC 7.8 4.4 - 11.3 x10*3/uL    nRBC 0.0 0.0 - 0.0 /100 WBCs    RBC 4.09 4.00 - 5.20 x10*6/uL    Hemoglobin 12.8 12.0 - 16.0 g/dL    Hematocrit 38.4 36.0 - 46.0 %    MCV 94 80 - 100 fL    MCH 31.3 26.0 - 34.0 pg    MCHC 33.3 32.0 - 36.0 g/dL    RDW 14.2 11.5 - 14.5 %    Platelets 212 150 - 450 x10*3/uL    MPV 10.6 7.5 - 11.5 fL     No results found.    Scheduled medications  acetaminophen, 650 mg, oral, Once  amLODIPine, 10 mg, oral, Daily  aspirin, 81 mg, oral, Daily  atorvastatin, 40 mg, oral, Nightly  bisoprolol, 5 mg, oral, Daily  busPIRone, 15 mg, oral, BID  carBAMazepine, 600 mg, oral, BID with meals  cefTRIAXone, 1 g, intravenous, q24h  cholecalciferol, 2,000 Units, oral, Daily  docusate sodium, 100 mg, oral, BID  estradiol, 0.5 g, vaginal, Once per day on Tue Fri  fluticasone furoate-vilanteroL, 1 puff, inhalation, Daily  tiotropium, 2 Inhalation, " inhalation, Daily   And  formoterol, 20 mcg, nebulization, q12h  gabapentin, 900 mg, oral, TID  heparin (porcine), 5,000 Units, subcutaneous, q8h  hydrALAZINE, 10 mg, oral, TID  lidocaine, 1 patch, transdermal, Daily  melatonin, 3 mg, oral, Nightly  methenamine hippurate, 1 g, oral, BID  pantoprazole, 40 mg, oral, Daily before breakfast  venlafaxine XR, 300 mg, oral, Daily      Continuous medications     PRN medications  PRN medications: acetaminophen **OR** acetaminophen **OR** acetaminophen, acetaminophen, albuterol, albuterol, benzocaine-menthol, dextromethorphan-guaifenesin, guaiFENesin, HYDROcodone-acetaminophen, hydrOXYzine pamoate, nystatin, orphenadrine, polyethylene glycol, traMADol    ASSESSMENT:  Falls  Closed head injury  Generalized weakness  Functional decline  Osteoarthritis  Cervical stenosis severe C 6-7  Chronic pain  Acute cystitis  Pyuria UTI  Hypertension  Hyperlipidemia  COPD  Depression  Hip pain  Elevated BUN    PLAN:  Patient is doing well this afternoon.  No new issues.  Neurology consultation.  She does not want to have an MRI done.  PT/OT.  Fall precautions.  Up with assistance only.  Bed and chair alarm at all times.  IV Ceftriaxone.  Follow up urine culture result if available.  Monitor temperature and white blood cell count.  Labs reviewed.  BUN 28 --> 35.  Creatinine 0.8 --> 1.0.  Blood pressure stable.  Encourage fluids.  Monitor BUN, creatinine.  Monitor blood pressure.  P.r.n analgesics.  Lidocaine patch right hip.  Deep vein thrombosis prophylaxis Heparin subcutaneous.  Supportive care.  Patient reassured.  Case management following for discharge planning.  [facility].  Anticipate discharge when arrangements are made by case management.  Discussed with Dr. Pearce.    Becca Lucas, APRN-CNP

## 2023-10-27 VITALS
HEART RATE: 63 BPM | BODY MASS INDEX: 34.72 KG/M2 | SYSTOLIC BLOOD PRESSURE: 122 MMHG | OXYGEN SATURATION: 97 % | TEMPERATURE: 98.4 F | HEIGHT: 66 IN | RESPIRATION RATE: 18 BRPM | WEIGHT: 216 LBS | DIASTOLIC BLOOD PRESSURE: 55 MMHG

## 2023-10-27 LAB
BASOPHILS # BLD AUTO: 0.06 X10*3/UL (ref 0–0.1)
BASOPHILS NFR BLD AUTO: 0.9 %
EOSINOPHIL # BLD AUTO: 0.12 X10*3/UL (ref 0–0.7)
EOSINOPHIL NFR BLD AUTO: 1.8 %
ERYTHROCYTE [DISTWIDTH] IN BLOOD BY AUTOMATED COUNT: 14 % (ref 11.5–14.5)
HCT VFR BLD AUTO: 36.2 % (ref 36–46)
HGB BLD-MCNC: 12 G/DL (ref 12–16)
IMM GRANULOCYTES # BLD AUTO: 0.03 X10*3/UL (ref 0–0.7)
IMM GRANULOCYTES NFR BLD AUTO: 0.5 % (ref 0–0.9)
LYMPHOCYTES # BLD AUTO: 2.28 X10*3/UL (ref 1.2–4.8)
LYMPHOCYTES NFR BLD AUTO: 34.7 %
MCH RBC QN AUTO: 31.1 PG (ref 26–34)
MCHC RBC AUTO-ENTMCNC: 33.1 G/DL (ref 32–36)
MCV RBC AUTO: 94 FL (ref 80–100)
MONOCYTES # BLD AUTO: 0.58 X10*3/UL (ref 0.1–1)
MONOCYTES NFR BLD AUTO: 8.8 %
NEUTROPHILS # BLD AUTO: 3.51 X10*3/UL (ref 1.2–7.7)
NEUTROPHILS NFR BLD AUTO: 53.3 %
NRBC BLD-RTO: 0 /100 WBCS (ref 0–0)
PLATELET # BLD AUTO: 193 X10*3/UL (ref 150–450)
PMV BLD AUTO: 11.3 FL (ref 7.5–11.5)
RBC # BLD AUTO: 3.86 X10*6/UL (ref 4–5.2)
WBC # BLD AUTO: 6.6 X10*3/UL (ref 4.4–11.3)

## 2023-10-27 PROCEDURE — 2500000002 HC RX 250 W HCPCS SELF ADMINISTERED DRUGS (ALT 637 FOR MEDICARE OP, ALT 636 FOR OP/ED): Performed by: NURSE PRACTITIONER

## 2023-10-27 PROCEDURE — 94640 AIRWAY INHALATION TREATMENT: CPT

## 2023-10-27 PROCEDURE — 36415 COLL VENOUS BLD VENIPUNCTURE: CPT | Performed by: NURSE PRACTITIONER

## 2023-10-27 PROCEDURE — 96372 THER/PROPH/DIAG INJ SC/IM: CPT | Performed by: INTERNAL MEDICINE

## 2023-10-27 PROCEDURE — 2500000004 HC RX 250 GENERAL PHARMACY W/ HCPCS (ALT 636 FOR OP/ED): Performed by: INTERNAL MEDICINE

## 2023-10-27 PROCEDURE — 2500000001 HC RX 250 WO HCPCS SELF ADMINISTERED DRUGS (ALT 637 FOR MEDICARE OP): Performed by: INTERNAL MEDICINE

## 2023-10-27 PROCEDURE — 85025 COMPLETE CBC W/AUTO DIFF WBC: CPT | Performed by: NURSE PRACTITIONER

## 2023-10-27 PROCEDURE — 2500000002 HC RX 250 W HCPCS SELF ADMINISTERED DRUGS (ALT 637 FOR MEDICARE OP, ALT 636 FOR OP/ED): Performed by: INTERNAL MEDICINE

## 2023-10-27 PROCEDURE — 2500000005 HC RX 250 GENERAL PHARMACY W/O HCPCS: Performed by: NURSE PRACTITIONER

## 2023-10-27 PROCEDURE — S4991 NICOTINE PATCH NONLEGEND: HCPCS | Performed by: NURSE PRACTITIONER

## 2023-10-27 RX ORDER — CEFUROXIME AXETIL 250 MG/1
250 TABLET ORAL 2 TIMES DAILY
Status: SHIPPED | OUTPATIENT
Start: 2023-10-27 | End: 2023-10-28

## 2023-10-27 RX ORDER — IBUPROFEN 200 MG
1 TABLET ORAL DAILY
Start: 2023-10-28

## 2023-10-27 RX ORDER — POLYETHYLENE GLYCOL 3350 17 G/17G
17 POWDER, FOR SOLUTION ORAL DAILY PRN
Start: 2023-10-27

## 2023-10-27 RX ORDER — LIDOCAINE 560 MG/1
1 PATCH PERCUTANEOUS; TOPICAL; TRANSDERMAL DAILY
Start: 2023-10-28

## 2023-10-27 RX ORDER — TRAMADOL HYDROCHLORIDE 50 MG/1
50 TABLET ORAL EVERY 6 HOURS PRN
Qty: 10 TABLET | Refills: 0 | Status: SHIPPED | OUTPATIENT
Start: 2023-10-27

## 2023-10-27 RX ADMIN — HEPARIN SODIUM 5000 UNITS: 5000 INJECTION, SOLUTION INTRAVENOUS; SUBCUTANEOUS at 14:50

## 2023-10-27 RX ADMIN — HYDRALAZINE HYDROCHLORIDE 10 MG: 10 TABLET, FILM COATED ORAL at 14:50

## 2023-10-27 RX ADMIN — CARBAMAZEPINE 600 MG: 200 TABLET ORAL at 16:34

## 2023-10-27 RX ADMIN — GABAPENTIN 900 MG: 300 CAPSULE ORAL at 14:50

## 2023-10-27 RX ADMIN — HEPARIN SODIUM 5000 UNITS: 5000 INJECTION, SOLUTION INTRAVENOUS; SUBCUTANEOUS at 08:33

## 2023-10-27 RX ADMIN — FLUTICASONE FUROATE AND VILANTEROL TRIFENATATE 1 PUFF: 200; 25 POWDER RESPIRATORY (INHALATION) at 07:11

## 2023-10-27 RX ADMIN — Medication 2000 UNITS: at 08:33

## 2023-10-27 RX ADMIN — FORMOTEROL FUMARATE DIHYDRATE 20 MCG: 20 SOLUTION RESPIRATORY (INHALATION) at 07:10

## 2023-10-27 RX ADMIN — VENLAFAXINE HYDROCHLORIDE 300 MG: 150 CAPSULE, EXTENDED RELEASE ORAL at 08:33

## 2023-10-27 RX ADMIN — CARBAMAZEPINE 600 MG: 200 TABLET ORAL at 08:33

## 2023-10-27 RX ADMIN — NICOTINE 1 PATCH: 21 PATCH, EXTENDED RELEASE TRANSDERMAL at 08:35

## 2023-10-27 RX ADMIN — AMLODIPINE BESYLATE 10 MG: 10 TABLET ORAL at 08:33

## 2023-10-27 RX ADMIN — HYDROCODONE BITARTRATE AND ACETAMINOPHEN 1 TABLET: 5; 325 TABLET ORAL at 16:27

## 2023-10-27 RX ADMIN — ASPIRIN 81 MG: 81 TABLET, COATED ORAL at 08:33

## 2023-10-27 RX ADMIN — DOCUSATE SODIUM 100 MG: 100 CAPSULE, LIQUID FILLED ORAL at 08:33

## 2023-10-27 RX ADMIN — GABAPENTIN 900 MG: 300 CAPSULE ORAL at 08:33

## 2023-10-27 RX ADMIN — BUSPIRONE HYDROCHLORIDE 15 MG: 5 TABLET ORAL at 08:33

## 2023-10-27 RX ADMIN — LIDOCAINE 1 PATCH: 560 PATCH PERCUTANEOUS; TOPICAL; TRANSDERMAL at 08:33

## 2023-10-27 RX ADMIN — PANTOPRAZOLE SODIUM 40 MG: 40 TABLET, DELAYED RELEASE ORAL at 05:47

## 2023-10-27 RX ADMIN — TIOTROPIUM BROMIDE INHALATION SPRAY 2 PUFF: 3.12 SPRAY, METERED RESPIRATORY (INHALATION) at 07:12

## 2023-10-27 RX ADMIN — HYDRALAZINE HYDROCHLORIDE 10 MG: 10 TABLET, FILM COATED ORAL at 08:33

## 2023-10-27 RX ADMIN — BISOPROLOL FUMARATE 5 MG: 5 TABLET, FILM COATED ORAL at 08:33

## 2023-10-27 ASSESSMENT — COGNITIVE AND FUNCTIONAL STATUS - GENERAL
STANDING UP FROM CHAIR USING ARMS: A LITTLE
DRESSING REGULAR UPPER BODY CLOTHING: A LITTLE
MOBILITY SCORE: 20
HELP NEEDED FOR BATHING: A LITTLE
DAILY ACTIVITIY SCORE: 20
MOVING TO AND FROM BED TO CHAIR: A LITTLE
TOILETING: A LITTLE
PERSONAL GROOMING: A LITTLE
CLIMB 3 TO 5 STEPS WITH RAILING: A LITTLE
WALKING IN HOSPITAL ROOM: A LITTLE

## 2023-10-27 ASSESSMENT — PAIN SCALES - GENERAL
PAINLEVEL_OUTOF10: 7
PAINLEVEL_OUTOF10: 0 - NO PAIN
PAINLEVEL_OUTOF10: 0 - NO PAIN

## 2023-10-27 ASSESSMENT — PAIN - FUNCTIONAL ASSESSMENT
PAIN_FUNCTIONAL_ASSESSMENT: FLACC (FACE, LEGS, ACTIVITY, CRY, CONSOLABILITY)
PAIN_FUNCTIONAL_ASSESSMENT: FLACC (FACE, LEGS, ACTIVITY, CRY, CONSOLABILITY)

## 2023-10-27 ASSESSMENT — PAIN SCALES - WONG BAKER: WONGBAKER_NUMERICALRESPONSE: NO HURT

## 2023-10-27 NOTE — NURSING NOTE
Assumed care of patient from Brandy MORGAN and patient is in bed awake and requested norco . Will be giving soon.

## 2023-10-27 NOTE — NURSING NOTE
Assumed care of pt. Bed side shift report received from previous nurse. Patient observed lying in bed with HOB elevated, A&O x 3. Respiration regular and unlabored on room air.  No concerns or needs voiced at this time. Call light within reach.bed in lowest position, locked. Bed alarm activated for pt safety. Will continue to monitor.

## 2023-10-27 NOTE — PROGRESS NOTES
[facility] in [place] is willing and able to accept.  PASRR completed. [facility] to arrange transport for 1630 .  Brandy MORGAN aware.  Jordyn Lucas CNP aware. Discharge letter obtained and sent to registration. Alice Hemphill (POA) notified.        Jaz Solomon, RN

## 2023-10-27 NOTE — CARE PLAN
Problem: Fall/Injury  Goal: Not fall by end of shift  Outcome: Progressing  Goal: Be free from injury by end of the shift  Outcome: Progressing  Goal: Verbalize understanding of personal risk factors for fall in the hospital  Outcome: Progressing  Goal: Verbalize understanding of risk factor reduction measures to prevent injury from fall in the home  Outcome: Progressing  Goal: Use assistive devices by end of the shift  Outcome: Progressing  Goal: Pace activities to prevent fatigue by end of the shift  Outcome: Progressing     Problem: Respiratory  Goal: Minimize anxiety/maximize coping throughout shift  Outcome: Progressing  Goal: No signs of respiratory distress (eg. Use of accessory muscles. Peds grunting)  Outcome: Progressing  Goal: Verbalize decreased shortness of breath this shift  Outcome: Progressing   The patient's goals for the shift include      The clinical goals for the shift include Pt will remain safe from falls this shift    Over the shift, the patient did not make progress toward the following goals. Barriers to progression include . Recommendations to address these barriers include .

## 2023-10-27 NOTE — DISCHARGE INSTRUCTIONS
If you have any questions, please contact Dr. Pearce's office at [phone].    Remove sutures in 3 days

## 2023-10-27 NOTE — NURSING NOTE
No changes from previous assessment noted. All safety measures maintained. Pt resting in bed comfortably. Call light in reach

## 2023-10-27 NOTE — PROGRESS NOTES
Babar Quigley is a 68 y.o. female on day 3 of admission presenting with Closed head injury, initial encounter.    Subjective   Patient seen and examined.  Resting in bed in no acute distress.  Awake alert oriented x 3.  Right hip pain, controlled.  No new complaints.  She wants to go home.  Waiting to speak with her niece.      Objective     Physical Exam  Vitals reviewed.   Constitutional:       General: She is not in acute distress.     Appearance: Normal appearance. She is obese. She is not ill-appearing or toxic-appearing.   HENT:      Head: Normocephalic and atraumatic.      Right Ear: Tympanic membrane normal.      Left Ear: Tympanic membrane normal.      Nose: Nose normal.      Mouth/Throat:      Mouth: Mucous membranes are moist.      Pharynx: Oropharynx is clear.   Eyes:      Extraocular Movements: Extraocular movements intact.      Conjunctiva/sclera: Conjunctivae normal.      Pupils: Pupils are equal, round, and reactive to light.   Cardiovascular:      Rate and Rhythm: Normal rate and regular rhythm.      Pulses: Normal pulses.      Heart sounds: Normal heart sounds. No murmur heard.  Pulmonary:      Effort: Pulmonary effort is normal. No respiratory distress.      Breath sounds: Normal breath sounds. No wheezing, rhonchi or rales.   Abdominal:      General: Bowel sounds are normal. There is no distension.      Palpations: Abdomen is soft.      Tenderness: There is no abdominal tenderness.   Genitourinary:     Comments: /rectal examination deferred  Musculoskeletal:         General: No swelling. Normal range of motion.      Cervical back: Normal range of motion and neck supple.   Skin:     General: Skin is warm and dry.      Capillary Refill: Capillary refill takes less than 2 seconds.      Comments: Scalp sutures in place no erythema, drainage   Neurological:      General: No focal deficit present.      Mental Status: She is alert and oriented to person, place, and time.      Comments: Follows all  "commands. No weakness   Psychiatric:         Mood and Affect: Mood normal.         Behavior: Behavior normal.       Last Recorded Vitals  Blood pressure 122/89, pulse 68, temperature 37 °C (98.6 °F), temperature source Oral, resp. rate 18, height 1.676 m (5' 6\"), weight 98 kg (216 lb), SpO2 96 %.    Intake/Output last 3 Shifts:  I/O last 3 completed shifts:  In: 2180 (22.3 mL/kg) [P.O.:2180]  Out: 0 (0 mL/kg)   Weight: 98 kg     Relevant Results  Results for orders placed or performed during the hospital encounter of 10/23/23 (from the past 24 hour(s))   Basic Metabolic Panel   Result Value Ref Range    Glucose 94 65 - 99 mg/dL    Sodium 135 133 - 145 mmol/L    Potassium 4.9 3.4 - 5.1 mmol/L    Chloride 104 97 - 107 mmol/L    Bicarbonate 22 (L) 24 - 31 mmol/L    Urea Nitrogen 33 (H) 8 - 25 mg/dL    Creatinine 0.90 0.40 - 1.60 mg/dL    eGFR 69 >60 mL/min/1.73m*2    Calcium 8.9 8.5 - 10.4 mg/dL    Anion Gap 9 <=19 mmol/L   CBC   Result Value Ref Range    WBC 6.7 4.4 - 11.3 x10*3/uL    nRBC 0.0 0.0 - 0.0 /100 WBCs    RBC 3.97 (L) 4.00 - 5.20 x10*6/uL    Hemoglobin 12.5 12.0 - 16.0 g/dL    Hematocrit 37.3 36.0 - 46.0 %    MCV 94 80 - 100 fL    MCH 31.5 26.0 - 34.0 pg    MCHC 33.5 32.0 - 36.0 g/dL    RDW 14.2 11.5 - 14.5 %    Platelets 200 150 - 450 x10*3/uL    MPV 11.1 7.5 - 11.5 fL     Scheduled medications  acetaminophen, 650 mg, oral, Once  amLODIPine, 10 mg, oral, Daily  aspirin, 81 mg, oral, Daily  atorvastatin, 40 mg, oral, Nightly  bisoprolol, 5 mg, oral, Daily  busPIRone, 15 mg, oral, BID  carBAMazepine, 600 mg, oral, BID with meals  cefTRIAXone, 1 g, intravenous, q24h  cholecalciferol, 2,000 Units, oral, Daily  docusate sodium, 100 mg, oral, BID  estradiol, 0.5 g, vaginal, Once per day on Tue Fri  fluticasone furoate-vilanteroL, 1 puff, inhalation, Daily  tiotropium, 2 Inhalation, inhalation, Daily   And  formoterol, 20 mcg, nebulization, q12h  gabapentin, 900 mg, oral, TID  heparin (porcine), 5,000 Units, " subcutaneous, q8h  hydrALAZINE, 10 mg, oral, TID  lidocaine, 1 patch, transdermal, Daily  melatonin, 3 mg, oral, Nightly  methenamine hippurate, 1 g, oral, BID  nicotine, 1 patch, transdermal, Daily  pantoprazole, 40 mg, oral, Daily before breakfast  venlafaxine XR, 300 mg, oral, Daily      Continuous medications     PRN medications  PRN medications: acetaminophen **OR** acetaminophen **OR** acetaminophen, acetaminophen, albuterol, albuterol, benzocaine-menthol, dextromethorphan-guaifenesin, guaiFENesin, HYDROcodone-acetaminophen, hydrOXYzine pamoate, nystatin, orphenadrine, polyethylene glycol, traMADol    ASSESSMENT:  Falls  Closed head injury  Generalized weakness  Functional decline  Osteoarthritis  Cervical stenosis severe C 6-7  Chronic pain  Acute cystitis  Pyuria  UTI  Hypertension  Hyperlipidemia  COPD  Depression  Hip pain  Elevated BUN improved    PLAN:  Patient is doing well.  No new issues.  Continue current treatment.  IV Ceftriaxone complete course of antibiotics.  Monitor I's and O's.  BUN improved 33.  Creatinine improved 0.9.  Blood pressure stable.  Encourage fluid intake.   Monitor.  P.r.n analgesics.  Lidocaine patch right hip.  PT/OT.  Fall precautions.  Up with assistance only.  Bed and chair alarm at all times.  Deep vein thrombosis prophylaxis Heparin subcutaneous.  Supportive care.  Patient reassured.  Case management following for discharge planning.  Awaiting discharge arrangements.  Discussed with Dr. Pearce.  Okay to discharge when arrangements are made by case management.    Becca Lucas, APRN-CNP

## 2023-10-27 NOTE — DISCHARGE SUMMARY
Discharge Diagnosis  Falls  Closed head injury, initial encounter  Generalized weakness  Functional decline  Osteoarthritis  Cervical stenosis severe C 5-6  Chronic pain  Acute cystitis  Pyuria  UTI  Hypertension  Hyperlipidemia  COPD  Depression  Hip pain  Elevated BUN improved    Discharge Medications  See discharge medication list    Hospital Course  This is a very pleasant 68-year-old  female who presented to the emergency department complaints of fall and head injury.  She has generalized weakness and sciatica.  She is bedbound and wheelchair-bound.  In the emergency department, initial work-up was done.  CT brain without contrast per radiology showed a left frontoparietal scalp laceration, nothing acute.  The cervical spine per radiology showed degenerative changes of the cervical spine including severe right foraminal stenosis at C6-C7 without spinal canal stenosis.  Urinalysis was consistent with a urinary tract infection.  She was treated in the emergency department and was admitted.  She was treated IV antibiotics for a urinary tract infection.  She was evaluated and treated by Neurology.  She was treated with analgesics Lidocaine patch and as needed Tylenol.  She was evaluated by physical and occupational therapy.  Her condition improved.  Case management was consulted for discharge planning.  She is stable for discharge per therapy recommendations as arranged by case management.    Pertinent Physical Exam At Time of Discharge  See physical examination    Outpatient Follow-Up  Follow up with primary care provider in 1 week       MILO Kraft-CNP

## 2023-10-27 NOTE — PROGRESS NOTES
Babar Quigley is a 68 y.o. female on day 4 of admission presenting with Closed head injury, initial encounter.    Subjective   Patient seen and examined.  Resting in bed in no acute distress awake alert and oriented x 3.  No complaints.  Discussed plan of care.  States she spoke with her niece yesterday.  She plans to discharge to [facility]    Objective     Physical Exam  Vitals reviewed.   Constitutional:       General: She is not in acute distress.     Appearance: Normal appearance. She is obese. She is not ill-appearing or toxic-appearing.   HENT:      Head: Normocephalic and atraumatic.      Right Ear: Tympanic membrane normal.      Left Ear: Tympanic membrane normal.      Nose: Nose normal.      Mouth/Throat:      Mouth: Mucous membranes are moist.      Pharynx: Oropharynx is clear.   Eyes:      Extraocular Movements: Extraocular movements intact.      Conjunctiva/sclera: Conjunctivae normal.      Pupils: Pupils are equal, round, and reactive to light.   Cardiovascular:      Rate and Rhythm: Normal rate and regular rhythm.      Pulses: Normal pulses.      Heart sounds: Normal heart sounds. No murmur heard.  Pulmonary:      Effort: Pulmonary effort is normal. No respiratory distress.      Breath sounds: Normal breath sounds. No wheezing, rhonchi or rales.   Abdominal:      General: Bowel sounds are normal. There is no distension.      Palpations: Abdomen is soft.      Tenderness: There is no abdominal tenderness.   Genitourinary:     Comments: Deferred  Musculoskeletal:         General: No swelling. Normal range of motion.      Cervical back: Normal range of motion and neck supple.   Skin:     General: Skin is warm and dry.      Capillary Refill: Capillary refill takes less than 2 seconds.      Comments: Scalp sutures in place no erythema, drainage   Neurological:      General: No focal deficit present.      Mental Status: She is alert and oriented to person, place, and time.      Comments:  "Follows all commands. No weakness   Psychiatric:         Mood and Affect: Mood normal.         Behavior: Behavior normal.       Last Recorded Vitals  Blood pressure 128/69, pulse 59, temperature 36.5 °C (97.7 °F), temperature source Oral, resp. rate 18, height 1.676 m (5' 6\"), weight 98 kg (216 lb), SpO2 100 %.    Intake/Output last 3 Shifts:  I/O last 3 completed shifts:  In: 1640 (16.7 mL/kg) [P.O.:1640]  Out: - (0 mL/kg)   Weight: 98 kg     Relevant Results  Results for orders placed or performed during the hospital encounter of 10/23/23 (from the past 24 hour(s))   CBC and Auto Differential   Result Value Ref Range    WBC 6.6 4.4 - 11.3 x10*3/uL    nRBC 0.0 0.0 - 0.0 /100 WBCs    RBC 3.86 (L) 4.00 - 5.20 x10*6/uL    Hemoglobin 12.0 12.0 - 16.0 g/dL    Hematocrit 36.2 36.0 - 46.0 %    MCV 94 80 - 100 fL    MCH 31.1 26.0 - 34.0 pg    MCHC 33.1 32.0 - 36.0 g/dL    RDW 14.0 11.5 - 14.5 %    Platelets 193 150 - 450 x10*3/uL    MPV 11.3 7.5 - 11.5 fL    Neutrophils % 53.3 40.0 - 80.0 %    Immature Granulocytes %, Automated 0.5 0.0 - 0.9 %    Lymphocytes % 34.7 13.0 - 44.0 %    Monocytes % 8.8 2.0 - 10.0 %    Eosinophils % 1.8 0.0 - 6.0 %    Basophils % 0.9 0.0 - 2.0 %    Neutrophils Absolute 3.51 1.20 - 7.70 x10*3/uL    Immature Granulocytes Absolute, Automated 0.03 0.00 - 0.70 x10*3/uL    Lymphocytes Absolute 2.28 1.20 - 4.80 x10*3/uL    Monocytes Absolute 0.58 0.10 - 1.00 x10*3/uL    Eosinophils Absolute 0.12 0.00 - 0.70 x10*3/uL    Basophils Absolute 0.06 0.00 - 0.10 x10*3/uL     No results found.    Scheduled medications  acetaminophen, 650 mg, oral, Once  amLODIPine, 10 mg, oral, Daily  aspirin, 81 mg, oral, Daily  atorvastatin, 40 mg, oral, Nightly  bisoprolol, 5 mg, oral, Daily  busPIRone, 15 mg, oral, BID  carBAMazepine, 600 mg, oral, BID with meals  cefTRIAXone, 1 g, intravenous, q24h  cholecalciferol, 2,000 Units, oral, Daily  docusate sodium, 100 mg, oral, BID  estradiol, 0.5 g, vaginal, Once per day on Tue " Fri  fluticasone furoate-vilanteroL, 1 puff, inhalation, Daily  tiotropium, 2 Inhalation, inhalation, Daily   And  formoterol, 20 mcg, nebulization, q12h  gabapentin, 900 mg, oral, TID  heparin (porcine), 5,000 Units, subcutaneous, q8h  hydrALAZINE, 10 mg, oral, TID  lidocaine, 1 patch, transdermal, Daily  melatonin, 3 mg, oral, Nightly  methenamine hippurate, 1 g, oral, BID  nicotine, 1 patch, transdermal, Daily  pantoprazole, 40 mg, oral, Daily before breakfast  venlafaxine XR, 300 mg, oral, Daily      Continuous medications     PRN medications  PRN medications: acetaminophen **OR** acetaminophen **OR** acetaminophen, acetaminophen, albuterol, albuterol, benzocaine-menthol, dextromethorphan-guaifenesin, guaiFENesin, HYDROcodone-acetaminophen, hydrOXYzine pamoate, nystatin, orphenadrine, polyethylene glycol, traMADol    ASSESSMENT:  Falls   Closed head injury  Generalized weakness  Functional decline  Osteoarthritis  Cervical stenosis severe C 5-6  Chronic pain  Acute cystitis  Pyuria  UTI  Hypertension  Hyperlipidemia  COPD  Depression  Hip pain  Elevated BUN improved    PLAN:  Patient is doing well this morning.  No new issues.  IV Ceftriaxone.  There is no urine culture available.  Complete course of antibiotics.  Monitor I's and O's.  Encourage fluid intake and activity.  As needed analgesics.  Lidocaine patch right hip.  Remove sutures in 3 days.  PT/OT.  Fall precautions.  Up with assistance only.  Bed and chair alarm at all times.  DVT prophylaxis Heparin subcutaneous.  Supportive care.  Patient reassured.  Case management following for discharge planning.  Discharge plan to [facility] today.  Discussed with Dr. Pearce.  Okay to discharge when arrangements are made by case management.  See discharge orders and instructions.    Becca Lucas, APRN-CNP

## 2023-11-13 ENCOUNTER — HOSPITAL ENCOUNTER (OUTPATIENT)
Dept: CARDIOLOGY | Facility: HOSPITAL | Age: 68
Discharge: HOME | End: 2023-11-13
Payer: OTHER GOVERNMENT

## 2023-11-13 LAB
ATRIAL RATE: 71 BPM
P AXIS: 51 DEGREES
P OFFSET: 199 MS
P ONSET: 145 MS
PR INTERVAL: 144 MS
Q ONSET: 217 MS
QRS COUNT: 12 BEATS
QRS DURATION: 90 MS
QT INTERVAL: 352 MS
QTC CALCULATION(BAZETT): 382 MS
QTC FREDERICIA: 372 MS
R AXIS: 24 DEGREES
T AXIS: 179 DEGREES
T OFFSET: 393 MS
VENTRICULAR RATE: 71 BPM

## 2023-11-13 PROCEDURE — 93005 ELECTROCARDIOGRAM TRACING: CPT

## 2024-04-13 ENCOUNTER — LAB REQUISITION (OUTPATIENT)
Dept: LAB | Facility: HOSPITAL | Age: 69
End: 2024-04-13
Payer: OTHER GOVERNMENT

## 2024-04-13 DIAGNOSIS — R30.0 DYSURIA: ICD-10-CM

## 2024-04-13 PROCEDURE — 87086 URINE CULTURE/COLONY COUNT: CPT

## 2024-04-13 PROCEDURE — 87186 SC STD MICRODIL/AGAR DIL: CPT

## 2024-04-16 ENCOUNTER — APPOINTMENT (OUTPATIENT)
Dept: RADIOLOGY | Facility: HOSPITAL | Age: 69
End: 2024-04-16
Payer: OTHER GOVERNMENT

## 2024-04-16 ENCOUNTER — APPOINTMENT (OUTPATIENT)
Dept: CARDIOLOGY | Facility: HOSPITAL | Age: 69
End: 2024-04-16
Payer: OTHER GOVERNMENT

## 2024-04-16 ENCOUNTER — HOSPITAL ENCOUNTER (EMERGENCY)
Facility: HOSPITAL | Age: 69
Discharge: HOME | End: 2024-04-16
Attending: STUDENT IN AN ORGANIZED HEALTH CARE EDUCATION/TRAINING PROGRAM
Payer: OTHER GOVERNMENT

## 2024-04-16 VITALS
BODY MASS INDEX: 30.61 KG/M2 | WEIGHT: 195 LBS | HEIGHT: 67 IN | RESPIRATION RATE: 18 BRPM | DIASTOLIC BLOOD PRESSURE: 74 MMHG | HEART RATE: 73 BPM | TEMPERATURE: 98.3 F | SYSTOLIC BLOOD PRESSURE: 190 MMHG | OXYGEN SATURATION: 92 %

## 2024-04-16 DIAGNOSIS — E87.1 HYPONATREMIA: ICD-10-CM

## 2024-04-16 DIAGNOSIS — E86.0 DEHYDRATION: Primary | ICD-10-CM

## 2024-04-16 DIAGNOSIS — R11.2 NAUSEA AND VOMITING, UNSPECIFIED VOMITING TYPE: ICD-10-CM

## 2024-04-16 LAB
ALBUMIN SERPL-MCNC: 4.1 G/DL (ref 3.5–5)
ALP BLD-CCNC: 139 U/L (ref 35–125)
ALT SERPL-CCNC: 22 U/L (ref 5–40)
ANION GAP SERPL CALC-SCNC: 13 MMOL/L
APPEARANCE UR: CLEAR
AST SERPL-CCNC: 22 U/L (ref 5–40)
ATRIAL RATE: 62 BPM
BASOPHILS # BLD AUTO: 0.05 X10*3/UL (ref 0–0.1)
BASOPHILS NFR BLD AUTO: 0.6 %
BILIRUB SERPL-MCNC: 0.4 MG/DL (ref 0.1–1.2)
BILIRUB UR STRIP.AUTO-MCNC: NEGATIVE MG/DL
BUN SERPL-MCNC: 15 MG/DL (ref 8–25)
CALCIUM SERPL-MCNC: 8.9 MG/DL (ref 8.5–10.4)
CHLORIDE SERPL-SCNC: 94 MMOL/L (ref 97–107)
CO2 SERPL-SCNC: 19 MMOL/L (ref 24–31)
COLOR UR: ABNORMAL
CREAT SERPL-MCNC: 0.7 MG/DL (ref 0.4–1.6)
CREAT UR-MCNC: 73 MG/DL
EGFRCR SERPLBLD CKD-EPI 2021: >90 ML/MIN/1.73M*2
EOSINOPHIL # BLD AUTO: 0.02 X10*3/UL (ref 0–0.7)
EOSINOPHIL NFR BLD AUTO: 0.2 %
ERYTHROCYTE [DISTWIDTH] IN BLOOD BY AUTOMATED COUNT: 13.2 % (ref 11.5–14.5)
GLUCOSE SERPL-MCNC: 128 MG/DL (ref 65–99)
GLUCOSE UR STRIP.AUTO-MCNC: NORMAL MG/DL
HCT VFR BLD AUTO: 42.4 % (ref 36–46)
HGB BLD-MCNC: 14.6 G/DL (ref 12–16)
IMM GRANULOCYTES # BLD AUTO: 0.04 X10*3/UL (ref 0–0.7)
IMM GRANULOCYTES NFR BLD AUTO: 0.5 % (ref 0–0.9)
KETONES UR STRIP.AUTO-MCNC: NEGATIVE MG/DL
LEUKOCYTE ESTERASE UR QL STRIP.AUTO: NEGATIVE
LIPASE SERPL-CCNC: 33 U/L (ref 16–63)
LYMPHOCYTES # BLD AUTO: 1.68 X10*3/UL (ref 1.2–4.8)
LYMPHOCYTES NFR BLD AUTO: 19.2 %
MCH RBC QN AUTO: 31.7 PG (ref 26–34)
MCHC RBC AUTO-ENTMCNC: 34.4 G/DL (ref 32–36)
MCV RBC AUTO: 92 FL (ref 80–100)
MONOCYTES # BLD AUTO: 0.55 X10*3/UL (ref 0.1–1)
MONOCYTES NFR BLD AUTO: 6.3 %
MUCOUS THREADS #/AREA URNS AUTO: NORMAL /LPF
NEUTROPHILS # BLD AUTO: 6.43 X10*3/UL (ref 1.2–7.7)
NEUTROPHILS NFR BLD AUTO: 73.2 %
NITRITE UR QL STRIP.AUTO: NEGATIVE
NRBC BLD-RTO: 0 /100 WBCS (ref 0–0)
P AXIS: 73 DEGREES
P OFFSET: 194 MS
P ONSET: 133 MS
PH UR STRIP.AUTO: 6 [PH]
PLATELET # BLD AUTO: 256 X10*3/UL (ref 150–450)
POTASSIUM SERPL-SCNC: 4.5 MMOL/L (ref 3.4–5.1)
PR INTERVAL: 170 MS
PROT SERPL-MCNC: 7.1 G/DL (ref 5.9–7.9)
PROT UR STRIP.AUTO-MCNC: ABNORMAL MG/DL
Q ONSET: 218 MS
QRS COUNT: 11 BEATS
QRS DURATION: 92 MS
QT INTERVAL: 404 MS
QTC CALCULATION(BAZETT): 410 MS
QTC FREDERICIA: 408 MS
R AXIS: 52 DEGREES
RBC # BLD AUTO: 4.61 X10*6/UL (ref 4–5.2)
RBC # UR STRIP.AUTO: NEGATIVE /UL
RBC #/AREA URNS AUTO: NORMAL /HPF
SODIUM SERPL-SCNC: 126 MMOL/L (ref 133–145)
SODIUM UR-SCNC: 84 MMOL/L
SODIUM/CREAT UR-RTO: 115 MMOL/G CREAT
SP GR UR STRIP.AUTO: 1.04
SQUAMOUS #/AREA URNS AUTO: NORMAL /HPF
T AXIS: 136 DEGREES
T OFFSET: 420 MS
TROPONIN T SERPL-MCNC: <6 NG/L
UROBILINOGEN UR STRIP.AUTO-MCNC: NORMAL MG/DL
VENTRICULAR RATE: 62 BPM
WBC # BLD AUTO: 8.8 X10*3/UL (ref 4.4–11.3)
WBC #/AREA URNS AUTO: NORMAL /HPF

## 2024-04-16 PROCEDURE — 80053 COMPREHEN METABOLIC PANEL: CPT | Performed by: STUDENT IN AN ORGANIZED HEALTH CARE EDUCATION/TRAINING PROGRAM

## 2024-04-16 PROCEDURE — 71046 X-RAY EXAM CHEST 2 VIEWS: CPT | Performed by: RADIOLOGY

## 2024-04-16 PROCEDURE — 2500000004 HC RX 250 GENERAL PHARMACY W/ HCPCS (ALT 636 FOR OP/ED): Performed by: CLINICAL NURSE SPECIALIST

## 2024-04-16 PROCEDURE — 85025 COMPLETE CBC W/AUTO DIFF WBC: CPT | Performed by: STUDENT IN AN ORGANIZED HEALTH CARE EDUCATION/TRAINING PROGRAM

## 2024-04-16 PROCEDURE — 96374 THER/PROPH/DIAG INJ IV PUSH: CPT

## 2024-04-16 PROCEDURE — 2550000001 HC RX 255 CONTRASTS: Performed by: CLINICAL NURSE SPECIALIST

## 2024-04-16 PROCEDURE — 81001 URINALYSIS AUTO W/SCOPE: CPT | Performed by: STUDENT IN AN ORGANIZED HEALTH CARE EDUCATION/TRAINING PROGRAM

## 2024-04-16 PROCEDURE — 83690 ASSAY OF LIPASE: CPT | Performed by: STUDENT IN AN ORGANIZED HEALTH CARE EDUCATION/TRAINING PROGRAM

## 2024-04-16 PROCEDURE — 82570 ASSAY OF URINE CREATININE: CPT | Performed by: CLINICAL NURSE SPECIALIST

## 2024-04-16 PROCEDURE — 74177 CT ABD & PELVIS W/CONTRAST: CPT

## 2024-04-16 PROCEDURE — 83930 ASSAY OF BLOOD OSMOLALITY: CPT | Mod: WESLAB | Performed by: CLINICAL NURSE SPECIALIST

## 2024-04-16 PROCEDURE — 84484 ASSAY OF TROPONIN QUANT: CPT | Performed by: CLINICAL NURSE SPECIALIST

## 2024-04-16 PROCEDURE — 83935 ASSAY OF URINE OSMOLALITY: CPT | Mod: WESLAB | Performed by: CLINICAL NURSE SPECIALIST

## 2024-04-16 PROCEDURE — 93005 ELECTROCARDIOGRAM TRACING: CPT

## 2024-04-16 PROCEDURE — 99285 EMERGENCY DEPT VISIT HI MDM: CPT | Mod: 25

## 2024-04-16 PROCEDURE — 36415 COLL VENOUS BLD VENIPUNCTURE: CPT | Performed by: STUDENT IN AN ORGANIZED HEALTH CARE EDUCATION/TRAINING PROGRAM

## 2024-04-16 PROCEDURE — 74177 CT ABD & PELVIS W/CONTRAST: CPT | Performed by: RADIOLOGY

## 2024-04-16 PROCEDURE — 71046 X-RAY EXAM CHEST 2 VIEWS: CPT

## 2024-04-16 RX ORDER — ONDANSETRON HYDROCHLORIDE 2 MG/ML
4 INJECTION, SOLUTION INTRAVENOUS ONCE
Status: COMPLETED | OUTPATIENT
Start: 2024-04-16 | End: 2024-04-16

## 2024-04-16 RX ORDER — ONDANSETRON 4 MG/1
4 TABLET, ORALLY DISINTEGRATING ORAL EVERY 8 HOURS PRN
Qty: 15 TABLET | Refills: 0 | Status: SHIPPED | OUTPATIENT
Start: 2024-04-16 | End: 2024-04-21

## 2024-04-16 RX ORDER — ONDANSETRON 8 MG/1
8 TABLET, ORALLY DISINTEGRATING ORAL EVERY 12 HOURS PRN
COMMUNITY
End: 2024-04-16

## 2024-04-16 RX ORDER — CYCLOBENZAPRINE HCL 10 MG
10 TABLET ORAL 3 TIMES DAILY PRN
COMMUNITY

## 2024-04-16 RX ORDER — SULFAMETHOXAZOLE AND TRIMETHOPRIM 800; 160 MG/1; MG/1
1 TABLET ORAL 2 TIMES DAILY
COMMUNITY

## 2024-04-16 RX ORDER — MECLIZINE HYDROCHLORIDE 25 MG/1
25 TABLET ORAL 3 TIMES DAILY PRN
COMMUNITY

## 2024-04-16 RX ORDER — IBUPROFEN 800 MG/1
800 TABLET ORAL EVERY 8 HOURS PRN
COMMUNITY

## 2024-04-16 RX ADMIN — ONDANSETRON 4 MG: 2 INJECTION INTRAMUSCULAR; INTRAVENOUS at 14:38

## 2024-04-16 RX ADMIN — SODIUM CHLORIDE 500 ML: 9 INJECTION, SOLUTION INTRAVENOUS at 16:10

## 2024-04-16 RX ADMIN — IOHEXOL 75 ML: 350 INJECTION, SOLUTION INTRAVENOUS at 14:15

## 2024-04-16 ASSESSMENT — COLUMBIA-SUICIDE SEVERITY RATING SCALE - C-SSRS
2. HAVE YOU ACTUALLY HAD ANY THOUGHTS OF KILLING YOURSELF?: NO
1. IN THE PAST MONTH, HAVE YOU WISHED YOU WERE DEAD OR WISHED YOU COULD GO TO SLEEP AND NOT WAKE UP?: NO
6. HAVE YOU EVER DONE ANYTHING, STARTED TO DO ANYTHING, OR PREPARED TO DO ANYTHING TO END YOUR LIFE?: NO

## 2024-04-16 NOTE — DISCHARGE INSTRUCTIONS
[place] diet  Increase fluids  Follow-up with primary care physician in 2 days for reevaluation  Follow-up with gastroenterology within 1 week if no improvement nausea vomiting  Return to the emergency department any worsening symptoms or concerns  It is importantly follow-up with your primary care physician for reevaluation and laboratory data due to your low sodium.

## 2024-04-16 NOTE — ED PROVIDER NOTES
Supervisory note:  Patient seen in conjunction with Ratna Degroot NP.  Patient presents with weakness and feeling unwell.  She states that she has been urinating more often than usual.  She has had urinary tract infections in the past with similar symptoms.  She states that she had many episodes of vomiting earlier today.  On examination, there is no abdominal or flank tenderness to palpation.  Laboratory studies reveal hyponatremia but are otherwise unremarkable.  Abdominal CT without acute findings.  Following treatment with antiemetics, patient reports that she is feeling better.  Patient was able to eat and drink in the emergency department.  Patient advised to follow-up with primary care physician.  My suspicion for bowel obstruction, ischemia, perforation, AAA, aortic dissection, appendicitis, cholecystitis as etiology of symptoms is very low.  Return precautions given for any worsening symptoms.    I personally saw the patient and made/approved the management plan and take responsiblity for the patient management.  Parts of this chart were completed with dictation software, please excuse any errors in transcription.     Bassam Cheney MD  04/16/24 9528

## 2024-04-16 NOTE — ED PROVIDER NOTES
Department of Emergency Medicine   ED  Provider Note  Admit Date/RoomTime: 4/16/2024 12:37 PM  ED Room: 14/[facility]        History of Present Illness:  Chief Complaint   Patient presents with    UTI     [facility]. Patient states history of sepsis.         Babar Quigley is a 69 y.o. female who cares for herself at home.  Presents to the emergency department with sudden onset of nausea and vomiting that started today body aches and generalized weakness.  Patient has history of chronic UTIs with similar presentation.  Denies abdominal pain.  No fever no chills no cough congestion runny nose sore throat.  No chest pain or shortness of breath.  No diarrhea.  She is unsure if she has any urinary symptoms her only complaint today is of vomiting and not feeling well with generalized weakness.     Review of Systems:   Pertinent positives and negatives are stated within HPI, all other systems reviewed and are negative.        --------------------------------------------- PAST HISTORY ---------------------------------------------  Past Medical History:  has a past medical history of Arthritis, COPD (chronic obstructive pulmonary disease) (Multi), and Hypertension.    She has no past medical history of Asthma ([facility]), Cancer (Multi), CHF (congestive heart failure) (Multi), Coronary artery disease, Diabetes mellitus (Multi), Disease of thyroid gland, History of transfusion, or Stroke (Multi).  Past Surgical History:  has a past surgical history that includes MR angio head wo IV contrast (02/22/2022); MR angio head wo IV contrast (02/22/2022); MR angio neck wo IV contrast (02/22/2022); MR angio neck wo IV contrast (04/28/2023); MR angio head wo IV contrast (04/28/2023); and Tonsillectomy.  Social History:  reports that she has been smoking cigarettes. She has a 100 pack-year smoking history. She has quit using smokeless tobacco. She reports that she  "does not currently use alcohol. She reports that she does not use drugs.  Family History: family history is not on file.. Unless otherwise noted, family history is non contributory  The patient’s home medications have been reviewed.  Allergies: Pregabalin        ---------------------------------------------------PHYSICAL EXAM--------------------------------------    GENERAL APPEARANCE: Awake and alert.  Ill-appearing female  VITAL SIGNS: As per the nurses' triage record.  Elevated blood pressure  HEENT: Normocephalic, atraumatic. Extraocular muscles are intact. Pupils equal round and reactive to light. Conjunctiva are pink. Negative scleral icterus. Mucous membranes are dry tongue in the midline. Pharynx was without erythema or exudates, uvula midline  NECK: Soft Nontender and supple, full gross ROM, no meningeal signs.  CHEST: Nontender to palpation. Clear to auscultation bilaterally. No rales, rhonchi, or wheezing.   HEART: S1, S2. Regular rate and rhythm. No murmurs, gallops or rubs.  Strong and equal pulses in the extremities.   ABDOMEN: Obese soft, nontender, nondistended, positive bowel sounds, no palpable masses.  MUSCULCSKELETAL: . Full gross active range of motion.   NEUROLOGICAL: Awake, alert and oriented x 3. Power intact in the upper and lower extremities. Sensation is intact to light touch in the upper and lower extremities.   IMMUNOLOGICAL: No lymphatic streaking noted   DERM: No petechiae, rashes, or ecchymoses.          ------------------------- NURSING NOTES AND VITALS REVIEWED ---------------------------  The nursing notes within the ED encounter and vital signs as below have been reviewed by myself  BP (!) 190/74   Pulse 73   Temp 36.8 °C (98.3 °F)   Resp 18   Ht 1.702 m (5' 7\")   Wt 88.5 kg (195 lb)   SpO2 (!) 92%   BMI 30.54 kg/m²     Oxygen Saturation Interpretation: 92% room air        The patient’s available past medical records and past encounters were reviewed.  "         -----------------------DIAGNOSTIC RESULTS------------------------  LABS:    Labs Reviewed   COMPREHENSIVE METABOLIC PANEL - Abnormal       Result Value    Glucose 128 (*)     Sodium 126 (*)     Potassium 4.5      Chloride 94 (*)     Bicarbonate 19 (*)     Urea Nitrogen 15      Creatinine 0.70      eGFR >90      Calcium 8.9      Albumin 4.1      Alkaline Phosphatase 139 (*)     Total Protein 7.1      AST 22      Bilirubin, Total 0.4      ALT 22      Anion Gap 13     URINALYSIS WITH REFLEX CULTURE AND MICROSCOPIC - Abnormal    Color, Urine Light-Yellow      Appearance, Urine Clear      Specific Gravity, Urine 1.045 (*)     pH, Urine 6.0      Protein, Urine 30 (1+) (*)     Glucose, Urine Normal      Blood, Urine NEGATIVE      Ketones, Urine NEGATIVE      Bilirubin, Urine NEGATIVE      Urobilinogen, Urine Normal      Nitrite, Urine NEGATIVE      Leukocyte Esterase, Urine NEGATIVE     LIPASE - Normal    Lipase 33     SERIAL TROPONIN, INITIAL ([facility]) - Normal    Troponin T, High Sensitivity <6     CBC WITH AUTO DIFFERENTIAL    WBC 8.8      nRBC 0.0      RBC 4.61      Hemoglobin 14.6      Hematocrit 42.4      MCV 92      MCH 31.7      MCHC 34.4      RDW 13.2      Platelets 256      Neutrophils % 73.2      Immature Granulocytes %, Automated 0.5      Lymphocytes % 19.2      Monocytes % 6.3      Eosinophils % 0.2      Basophils % 0.6      Neutrophils Absolute 6.43      Immature Granulocytes Absolute, Automated 0.04      Lymphocytes Absolute 1.68      Monocytes Absolute 0.55      Eosinophils Absolute 0.02      Basophils Absolute 0.05     TROPONIN T SERIES, HIGH SENSITIVITY (0, 2 HR, 6 HR)    Narrative:     The following orders were created for panel order Troponin T Series, High Sensitivity (0, 2HR, 6HR).  Procedure                               Abnormality         Status                     ---------                               -----------         ------                     Serial Troponin, Initial...[[id number]]   Normal              Final result                 Please view results for these tests on the individual orders.   SODIUM, URINE RANDOM    Sodium, Urine Random 84      Creatinine, Urine Random 73.0      Sodium/Creatinine Ratio 115     URINALYSIS WITH REFLEX CULTURE AND MICROSCOPIC    Narrative:     The following orders were created for panel order Urinalysis with Reflex Culture and Microscopic.  Procedure                               Abnormality         Status                     ---------                               -----------         ------                     Urinalysis with Reflex C...[[id number]]  Abnormal            Final result               Extra Urine Gray Tube[[id number]]                            In process                   Please view results for these tests on the individual orders.   EXTRA URINE GRAY TUBE   OSMOLALITY, URINE   OSMOLALITY   URINALYSIS MICROSCOPIC WITH REFLEX CULTURE    WBC, Urine 1-5      RBC, Urine 1-2      Squamous Epithelial Cells, Urine 1-9 (SPARSE)      Mucus, Urine FEW         As interpreted by me, the above displayed labs are abnormal. All other labs obtained during this visit were within normal range or not returned as of this dictation.      EKG Interpretation    EKG @ :    EKG Time:1306  EKG Interpretation time:1310  EKG Interpretation: EKG shows normal sinus rhythm, nonspecific T wave changes, normal axis, QTc normal at 410    EKG was interpreted by myself independently per Attending note            CT abdomen pelvis w IV contrast   Final Result   1.  12 mm hypodensity in the left kidney, which most likely is   related to a cyst which has also decreased in size since the prior   exam.        2. Probable worsening of scarring in the right lung base.        MACRO:   None        Signed by: Jessy Humphries 4/16/2024 3:31 PM   Dictation workstation:   [id number]      XR chest 2 views   Final Result   Mild cardiomegaly.             MACRO:   None        Signed by: Jessy Humphries  4/16/2024 3:01 PM   Dictation workstation:   [id number]              CT abdomen pelvis w IV contrast   Final Result   1.  12 mm hypodensity in the left kidney, which most likely is   related to a cyst which has also decreased in size since the prior   exam.        2. Probable worsening of scarring in the right lung base.        MACRO:   None        Signed by: Jessy Humphries 4/16/2024 3:31 PM   Dictation workstation:   [id number]      XR chest 2 views   Final Result   Mild cardiomegaly.             MACRO:   None        Signed by: Jessy Humphries 4/16/2024 3:01 PM   Dictation workstation:   [id number]              ------------------------------ ED COURSE/MEDICAL DECISION MAKING----------------------  Medical Decision Making:   Exam: A medically appropriate exam performed, outlined above, given the known history and presentation.    History obtained from: Review of medical record nursing notes patient      Social Determinants of Health considered during this visit: Lives at home.  Cares for herself at home          PAST MEDICAL HISTORY/Chronic Conditions Affecting Care     has a past medical history of Arthritis, COPD (chronic obstructive pulmonary disease) (Multi), and Hypertension.       CC/HPI Summary, Social Determinants of health, Records Reviewed, DDx, testing done/not done, ED Course, Reassessment, disposition considerations/shared decision making with patient, consults, disposition:   Presents with nausea vomiting decreased appetite generalized weakness  Plan  EKG  Zofran  CT abdomen pelvis 1.  12 mm hypodensity in the left kidney, which most likely is  related to a cyst which has also decreased in size since the prior  exam.   2. Probable worsening of scarring in the right lung base.  Chest x-ray Mild cardiomegaly.   CBC  CMP  Lipase  Osmolarity  Urine osmolarity  Sodium urine  Troponin  Urine  Medical Decision Making/Differential Diagnosis:  Differentials include not limited to electrolyte abnormality versus  anemia versus dehydration versus UTI versus gastroenteritis versus diverticulitis versus obstruction versus viral illness versus acute coronary syndrome  Review:  Urine negative for leukocytes nitrates positive for protein specific value 1.045.  Not consistent with UTI  Lipase 33  Urine sodium 84  Osmolarity 73  Urine osmolarity 115  Glucose 128  Sodium 126  Chloride 94  Bicarb 19  BUN 15  Creatinine 0.7  LFTs within normal limits  Troponin less than 6  White blood cell count 8.8  Hemoglobin 14.6  Patient presented with nausea concerned she may have a UTI as well as vomiting.  With generalized weakness.  Patient found to be hyponatremic sodium 126 bicarb low at 19.  Otherwise electrolytes unremarkable.  Normal renal function.  LFTs within normal limits with an elevated alkaline phos.  Lipase normal.  No elevation white blood cell count patient is not anemic.  Pending at this time.  Due to the low sodium added sodium urine and urine osmolarity.  Which were negative.  Urine is not consistent with UTI ketones noted.  CT of the abdomen pelvis showed an 11 mm hypodense area in the left kidney likely related to a cyst and is decreased in size from prior exam.  Worsening scarring noted in the right lung base.  Patient denies any cough congestion lungs are clear on exam.  Chest x-ray showed moderate cardiomegaly EKG per attending note sinus rhythm, nonspecific T wave changes.  No complaints of chest pain.  Troponin negative.  Lipase normal  Patient was monitored in the emergency department IV fluids ordered based on her clinical presentation history and symptoms consistent with nausea vomiting dehydration.  Hyponatremia patient reports she is feeling better after receiving fluids Zofran.  P.o. challenge performed.  Suspect hyponatremia secondary to dehydration.  Outpatient follow-up with primary care physician.  Return with any worsening symptoms or concerns urine was not consistent with UTI  Patient seen and evaluated with  attending physician Dr. Cheney   PROCEDURES  Unless otherwise noted below, none      CONSULTS:   None      ED Course as of 04/16/24 1718   Tue Apr 16, 2024   1310 EKG Time:1306  EKG Interpretation time:1310  EKG Interpretation: EKG shows normal sinus rhythm, nonspecific T wave changes, normal axis, QTc normal at 410    EKG was interpreted by myself independently [JL]   1652 Patient reports she is feeling much better after receiving Zofran and fluids.  She was able to sleep no further vomiting.  P.o. challenge [TB]   1717 On reevaluation after p.o. challenge patient tolerated p.o. fluids with no further vomiting.  Reports she feels better and ready to go home at this time [TB]      ED Course User Index  [JL] Chon Carlos DO  [TB] KATHERINE Hickey         Diagnoses as of 04/16/24 1718   Dehydration   Nausea and vomiting, unspecified vomiting type   Hyponatremia         This patient has remained hemodynamically stable during their ED course.      Critical Care: none        Counseling:  The emergency provider has spoken with the patient and discussed today’s results, in addition to providing specific details for the plan of care and counseling regarding the diagnosis and prognosis.  Questions are answered at this time and they are agreeable with the plan.         --------------------------------- IMPRESSION AND DISPOSITION ---------------------------------    IMPRESSION  1. Dehydration    2. Nausea and vomiting, unspecified vomiting type    3. Hyponatremia        DISPOSITION  Disposition: Discharge home  Patient condition is stable improved        NOTE: This report was transcribed using voice recognition software. Every effort was made to ensure accuracy; however, inadvertent computerized transcription errors may be present      KATHERINE Hickey  04/16/24 1718

## 2024-04-16 NOTE — PROGRESS NOTES
Pharmacy Medication History Review    Babar Quigley is a 69 y.o. female admitted for No Principal Problem: There is no principal problem currently on the Problem List. Please update the Problem List and refresh.. Pharmacy reviewed the patient's [id number] medications and allergies for accuracy.    Medications ADDED:  Cyclobenzaprine 10mg  Ibuprofen 800mg  Ondansetron 8mg ODT  Bactrim 800-160  Medications CHANGED:  Miralax - not taking  Nicotine patch 21mg  Medications REMOVED:   Lovastatin 20mg  Tramadol 50mg  Hydrocodone-acetaminaphen 5-325mg     The list below reflects the updated PTA list. Comments regarding how patient may be taking medications differently can be found in the Admit Orders Activity  Prior to Admission Medications   Prescriptions Last Dose Informant   DULoxetine (Cymbalta) 60 mg DR capsule  Family, self   Sig: Take 1 capsule (60 mg) by mouth once daily.   acetaminophen (Tylenol) 325 mg tablet  Family, self   Sig: Take 1 tablet (325 mg) by mouth every 6 hours if needed.   albuterol 2.5 mg /3 mL (0.083 %) nebulizer solution  Family, self   Sig: Take 3 mL (2.5 mg) by nebulization 4 times a day.   albuterol 90 mcg/actuation inhaler  Family, self   Sig: Inhale 2 puffs every 6 hours if needed for shortness of breath.   amLODIPine (Norvasc) 10 mg tablet  Family, self   Sig: Take 1 tablet (10 mg) by mouth once daily.   aspirin 81 mg EC tablet  Family, self   Sig: Take 1 tablet (81 mg) by mouth once daily.   atorvastatin (Lipitor) 40 mg tablet  Family, self   Sig: Take 1 tablet (40 mg) by mouth once daily at bedtime.   bisoprolol (Zebeta) 5 mg tablet  Family, self   Sig: Take 1 tablet (5 mg) by mouth once daily.   busPIRone (Buspar) 15 mg tablet  Family, self   Sig: Take 1 tablet (15 mg) by mouth 2 times a day.   carBAMazepine (TEGretol) 200 mg tablet  Family, self   Sig: Take 1 tablet (200 mg) by mouth 2 times a day.   cholecalciferol (Vitamin D-3) 50 MCG (2000 UT) tablet  Family, self   Sig:  Take 1 tablet (2,000 Units) by mouth once daily.   cyclobenzaprine (Flexeril) 10 mg tablet  Family, self   Sig: Take 1 tablet (10 mg) by mouth 3 times a day as needed for muscle spasms.   docusate sodium (Colace) 100 mg capsule  Family, self   Sig: Take 1 capsule (100 mg) by mouth 2 times a day.   estradiol (Estrace) 0.01 % (0.1 mg/gram) vaginal cream  Family, self   Sig: Insert 0.5 g into the vagina 2 times a week.   fluticasone propion-salmeteroL (Advair Diskus) 250-50 mcg/dose diskus inhaler  Family, self   Sig: Inhale 1 puff 2 times a day.   gabapentin (Neurontin) 300 mg capsule  Family, self   Sig: Take 3 capsules (900 mg) by mouth 3 times a day.   hydrALAZINE (Apresoline) 10 mg tablet  Family, self   Sig: Take 1 tablet (10 mg) by mouth 3 times a day.   hydrOXYzine pamoate (Vistaril) 25 mg capsule  Family, self   Sig: Take 1 capsule (25 mg) by mouth 3 times a day as needed.   ibuprofen 800 mg tablet  Family, self   Sig: Take 1 tablet (800 mg) by mouth every 8 hours if needed for mild pain (1 - 3).   lidocaine 4 % patch  Family, self   Sig: Place 1 patch over 12 hours on the skin once daily. Remove & discard patch within 12 hours or as directed by MD.    Apply to site of pain Do not start before October 28, 2023.   meclizine (Antivert) 25 mg tablet  Family, self   Sig: Take 1 tablet (25 mg) by mouth 3 times a day as needed for dizziness.   melatonin 3 mg tablet  Family, self   Sig: Take 1 tablet (3 mg) by mouth once daily at bedtime.   methenamine hippurate (Hiprex) 1 gram tablet  Family, self   Sig: Take 1 tablet (1 g) by mouth 2 times a day.   mometasone 200 mcg/actuation HFA aerosol inhaler  Family, self   Sig: Inhale 200 mcg 2 times a day.   nystatin (Mycostatin) cream  Family, self   Sig: Apply 100,000 g/m2 topically if needed (BID).   omeprazole OTC (PriLOSEC OTC) 20 mg EC tablet  Family, self   Sig: Take 1 tablet (20 mg) by mouth 2 times a day before meals.   ondansetron ODT (Zofran-ODT) 8 mg  disintegrating tablet  Family, self   Sig: Take 1 tablet (8 mg) by mouth every 12 hours if needed for nausea or vomiting.   sulfamethoxazole-trimethoprim (Bactrim DS) 800-160 mg tablet  Family, self   Sig: Take 1 tablet by mouth 2 times a day.   tiotropium-olodateroL (Stiolto Respimat) 2.5-2.5 mcg/actuation mist inhaler  Family, self   Sig: Inhale 2 Inhalations once daily.   venlafaxine XR (Effexor-XR) 150 mg 24 hr capsule  Family, self   Sig: Take 2 capsules (300 mg) by mouth once daily.      Facility-Administered Medications: None        The list below reflects the updated allergy list. Please review each documented allergy for additional clarification and justification.  Allergies  Reviewed by Albertina Bustos on 4/16/2024        Severity Reactions Comments    Pregabalin Medium Hallucinations             Pharmacy has been updated to [facility] on [address].    Sources used to complete the med history include PTA medication list, dispense history, patient/family interview. Informants are fair historians.    Below are additional concerns with the patient's PTA list.  Patient anfd family unsure of which inhalers are current and being used. Patient waiting for methenamine to be delivered by pharmacy at home for several weeks. Patient reports being out and family suspects this is what led to UTI.    Albertina Bustos  Please reach out via [employer] Secure Chat for questions

## 2024-04-17 LAB
BACTERIA UR CULT: ABNORMAL
HOLD SPECIMEN: NORMAL
OSMOLALITY SERPL: 270 MOSM/KG (ref 280–300)
OSMOLALITY UR: 533 MOSM/KG (ref 200–1200)